# Patient Record
Sex: FEMALE | Race: WHITE | NOT HISPANIC OR LATINO | Employment: FULL TIME | URBAN - METROPOLITAN AREA
[De-identification: names, ages, dates, MRNs, and addresses within clinical notes are randomized per-mention and may not be internally consistent; named-entity substitution may affect disease eponyms.]

---

## 2017-08-31 ENCOUNTER — NON-PROVIDER VISIT (OUTPATIENT)
Dept: OCCUPATIONAL MEDICINE | Facility: CLINIC | Age: 22
End: 2017-08-31

## 2017-08-31 DIAGNOSIS — Z02.1 PRE-EMPLOYMENT DRUG SCREENING: ICD-10-CM

## 2017-08-31 DIAGNOSIS — Z02.1 DRUG TESTING, PRE-EMPLOYMENT: ICD-10-CM

## 2017-08-31 LAB
AMP AMPHETAMINE: NORMAL
COC COCAINE: NORMAL
INT CON NEG: NORMAL
INT CON POS: NORMAL
MET METHAMPHETAMINES: NORMAL
OPI OPIATES: NORMAL
PCP PHENCYCLIDINE: NORMAL
POC DRUG COMMENT 753798-OCCUPATIONAL HEALTH: NEGATIVE
THC: NORMAL

## 2017-08-31 PROCEDURE — 86580 TB INTRADERMAL TEST: CPT

## 2017-08-31 PROCEDURE — 80305 DRUG TEST PRSMV DIR OPT OBS: CPT | Performed by: PREVENTIVE MEDICINE

## 2018-07-31 ENCOUNTER — HOSPITAL ENCOUNTER (INPATIENT)
Dept: HOSPITAL 8 - LDOP | Age: 23
LOS: 2 days | Discharge: HOME | End: 2018-08-02
Attending: OBSTETRICS & GYNECOLOGY | Admitting: OBSTETRICS & GYNECOLOGY
Payer: COMMERCIAL

## 2018-07-31 VITALS — SYSTOLIC BLOOD PRESSURE: 127 MMHG | DIASTOLIC BLOOD PRESSURE: 75 MMHG

## 2018-07-31 VITALS — DIASTOLIC BLOOD PRESSURE: 78 MMHG | SYSTOLIC BLOOD PRESSURE: 122 MMHG

## 2018-07-31 VITALS — HEIGHT: 62 IN | WEIGHT: 145.51 LBS | BODY MASS INDEX: 26.78 KG/M2

## 2018-07-31 VITALS — SYSTOLIC BLOOD PRESSURE: 122 MMHG | DIASTOLIC BLOOD PRESSURE: 78 MMHG

## 2018-07-31 VITALS — DIASTOLIC BLOOD PRESSURE: 83 MMHG | SYSTOLIC BLOOD PRESSURE: 136 MMHG

## 2018-07-31 VITALS — DIASTOLIC BLOOD PRESSURE: 72 MMHG | SYSTOLIC BLOOD PRESSURE: 122 MMHG

## 2018-07-31 DIAGNOSIS — Z23: ICD-10-CM

## 2018-07-31 DIAGNOSIS — Z3A.35: ICD-10-CM

## 2018-07-31 LAB
<PLATELET ESTIMATE>: ADEQUATE
<PLT MORPHOLOGY>: (no result)
BASOPHILS # BLD AUTO: 0.01 X10^3/UL (ref 0–0.1)
BASOPHILS # BLD AUTO: 0.04 X10^3/UL (ref 0–0.1)
BASOPHILS NFR BLD AUTO: 0 % (ref 0–1)
BASOPHILS NFR BLD AUTO: 0 % (ref 0–1)
EOSINOPHIL # BLD AUTO: 0 X10^3/UL (ref 0–0.4)
EOSINOPHIL # BLD AUTO: 0.22 X10^3/UL (ref 0–0.4)
EOSINOPHIL NFR BLD AUTO: 0 % (ref 1–7)
EOSINOPHIL NFR BLD AUTO: 1 % (ref 1–7)
ERYTHROCYTE [DISTWIDTH] IN BLOOD BY AUTOMATED COUNT: 13.9 % (ref 9.6–15.2)
ERYTHROCYTE [DISTWIDTH] IN BLOOD BY AUTOMATED COUNT: 14.1 % (ref 9.6–15.2)
HYPOCHROMIA BLD QL SMEAR: (no result)
LYMPHOCYTES # BLD AUTO: 1.56 X10^3/UL (ref 1–3.4)
LYMPHOCYTES # BLD AUTO: 3.28 X10^3/UL (ref 1–3.4)
LYMPHOCYTES NFR BLD AUTO: 19 % (ref 22–44)
LYMPHOCYTES NFR BLD AUTO: 6 % (ref 22–44)
MCH RBC QN AUTO: 26.7 PG (ref 27–34.8)
MCH RBC QN AUTO: 26.7 PG (ref 27–34.8)
MCHC RBC AUTO-ENTMCNC: 33.5 G/DL (ref 32.4–35.8)
MCHC RBC AUTO-ENTMCNC: 33.6 G/DL (ref 32.4–35.8)
MCV RBC AUTO: 79.4 FL (ref 80–100)
MCV RBC AUTO: 79.8 FL (ref 80–100)
MD: (no result)
MD: NO
MICROCYTES BLD QL SMEAR: (no result)
MICROSCOPIC: (no result)
MONOCYTES # BLD AUTO: 0.8 X10^3/UL (ref 0.2–0.8)
MONOCYTES # BLD AUTO: 1.28 X10^3/UL (ref 0.2–0.8)
MONOCYTES NFR BLD AUTO: 3 % (ref 2–9)
MONOCYTES NFR BLD AUTO: 8 % (ref 2–9)
NEUTROPHILS # BLD AUTO: 12.25 X10^3/UL (ref 1.8–6.8)
NEUTROPHILS # BLD AUTO: 24.47 X10^3/UL (ref 1.8–6.8)
NEUTROPHILS NFR BLD AUTO: 72 % (ref 42–75)
NEUTROPHILS NFR BLD AUTO: 91 % (ref 42–75)
PLATELET # BLD AUTO: 275 X10^3/UL (ref 130–400)
PLATELET # BLD AUTO: 297 X10^3/UL (ref 130–400)
PMV BLD AUTO: 9.2 FL (ref 7.4–10.4)
PMV BLD AUTO: 9.5 FL (ref 7.4–10.4)
POLYCHROMASIA BLD QL SMEAR: (no result)
RBC # BLD AUTO: 4.09 X10^6/UL (ref 3.82–5.3)
RBC # BLD AUTO: 4.4 X10^6/UL (ref 3.82–5.3)

## 2018-07-31 PROCEDURE — 87086 URINE CULTURE/COLONY COUNT: CPT

## 2018-07-31 PROCEDURE — 90715 TDAP VACCINE 7 YRS/> IM: CPT

## 2018-07-31 PROCEDURE — 99285 EMERGENCY DEPT VISIT HI MDM: CPT

## 2018-07-31 PROCEDURE — 86850 RBC ANTIBODY SCREEN: CPT

## 2018-07-31 PROCEDURE — 85025 COMPLETE CBC W/AUTO DIFF WBC: CPT

## 2018-07-31 PROCEDURE — 10060 I&D ABSCESS SIMPLE/SINGLE: CPT

## 2018-07-31 PROCEDURE — 0UQMXZZ REPAIR VULVA, EXTERNAL APPROACH: ICD-10-PCS | Performed by: OBSTETRICS & GYNECOLOGY

## 2018-07-31 PROCEDURE — 36415 COLL VENOUS BLD VENIPUNCTURE: CPT

## 2018-07-31 PROCEDURE — 82803 BLOOD GASES ANY COMBINATION: CPT

## 2018-07-31 PROCEDURE — 81001 URINALYSIS AUTO W/SCOPE: CPT

## 2018-07-31 PROCEDURE — 86900 BLOOD TYPING SEROLOGIC ABO: CPT

## 2018-07-31 PROCEDURE — 87081 CULTURE SCREEN ONLY: CPT

## 2018-07-31 RX ADMIN — IBUPROFEN PRN MG: 600 TABLET ORAL at 22:18

## 2018-07-31 RX ADMIN — Medication SCH MLS/HR: at 16:13

## 2018-07-31 RX ADMIN — FENTANYL CITRATE PRN MCG: 50 INJECTION INTRAMUSCULAR; INTRAVENOUS at 03:52

## 2018-07-31 RX ADMIN — FENTANYL CITRATE PRN MCG: 50 INJECTION INTRAMUSCULAR; INTRAVENOUS at 04:47

## 2018-07-31 RX ADMIN — SODIUM CHLORIDE, SODIUM LACTATE, POTASSIUM CHLORIDE, AND CALCIUM CHLORIDE SCH MLS/HR: .6; .31; .03; .02 INJECTION, SOLUTION INTRAVENOUS at 11:00

## 2018-07-31 RX ADMIN — IBUPROFEN PRN MG: 600 TABLET ORAL at 06:37

## 2018-07-31 RX ADMIN — Medication SCH MLS/HR: at 06:13

## 2018-07-31 RX ADMIN — DOCUSATE SODIUM PRN MG: 100 CAPSULE, LIQUID FILLED ORAL at 22:19

## 2018-07-31 RX ADMIN — PRENATAL VIT W/ FE FUMARATE-FA TAB 27-0.8 MG SCH EACH: 27-0.8 TAB at 09:00

## 2018-07-31 RX ADMIN — SODIUM CHLORIDE, SODIUM LACTATE, POTASSIUM CHLORIDE, AND CALCIUM CHLORIDE SCH MLS/HR: .6; .31; .03; .02 INJECTION, SOLUTION INTRAVENOUS at 02:40

## 2018-07-31 RX ADMIN — SODIUM CHLORIDE, SODIUM LACTATE, POTASSIUM CHLORIDE, AND CALCIUM CHLORIDE SCH MLS/HR: .6; .31; .03; .02 INJECTION, SOLUTION INTRAVENOUS at 19:00

## 2018-08-01 VITALS — SYSTOLIC BLOOD PRESSURE: 118 MMHG | DIASTOLIC BLOOD PRESSURE: 68 MMHG

## 2018-08-01 VITALS — DIASTOLIC BLOOD PRESSURE: 68 MMHG | SYSTOLIC BLOOD PRESSURE: 118 MMHG

## 2018-08-01 VITALS — SYSTOLIC BLOOD PRESSURE: 117 MMHG | DIASTOLIC BLOOD PRESSURE: 70 MMHG

## 2018-08-01 VITALS — DIASTOLIC BLOOD PRESSURE: 79 MMHG | SYSTOLIC BLOOD PRESSURE: 127 MMHG

## 2018-08-01 RX ADMIN — Medication SCH MLS/HR: at 22:13

## 2018-08-01 RX ADMIN — SODIUM CHLORIDE, SODIUM LACTATE, POTASSIUM CHLORIDE, AND CALCIUM CHLORIDE SCH MLS/HR: .6; .31; .03; .02 INJECTION, SOLUTION INTRAVENOUS at 11:00

## 2018-08-01 RX ADMIN — Medication SCH MLS/HR: at 02:13

## 2018-08-01 RX ADMIN — SODIUM CHLORIDE, SODIUM LACTATE, POTASSIUM CHLORIDE, AND CALCIUM CHLORIDE SCH MLS/HR: .6; .31; .03; .02 INJECTION, SOLUTION INTRAVENOUS at 12:25

## 2018-08-01 RX ADMIN — IBUPROFEN PRN MG: 600 TABLET ORAL at 05:40

## 2018-08-01 RX ADMIN — Medication SCH MLS/HR: at 12:13

## 2018-08-01 RX ADMIN — DOCUSATE SODIUM PRN MG: 100 CAPSULE, LIQUID FILLED ORAL at 09:53

## 2018-08-01 RX ADMIN — PRENATAL VIT W/ FE FUMARATE-FA TAB 27-0.8 MG SCH EACH: 27-0.8 TAB at 09:53

## 2018-08-01 RX ADMIN — DOCUSATE SODIUM PRN MG: 100 CAPSULE, LIQUID FILLED ORAL at 19:36

## 2018-08-01 RX ADMIN — SODIUM CHLORIDE, SODIUM LACTATE, POTASSIUM CHLORIDE, AND CALCIUM CHLORIDE SCH MLS/HR: .6; .31; .03; .02 INJECTION, SOLUTION INTRAVENOUS at 03:00

## 2018-08-02 VITALS — DIASTOLIC BLOOD PRESSURE: 73 MMHG | SYSTOLIC BLOOD PRESSURE: 111 MMHG

## 2018-08-02 RX ADMIN — DOCUSATE SODIUM PRN MG: 100 CAPSULE, LIQUID FILLED ORAL at 08:42

## 2018-08-02 RX ADMIN — IBUPROFEN PRN MG: 600 TABLET ORAL at 03:16

## 2018-08-02 RX ADMIN — SODIUM CHLORIDE, SODIUM LACTATE, POTASSIUM CHLORIDE, AND CALCIUM CHLORIDE SCH MLS/HR: .6; .31; .03; .02 INJECTION, SOLUTION INTRAVENOUS at 11:00

## 2018-08-02 RX ADMIN — SODIUM CHLORIDE, SODIUM LACTATE, POTASSIUM CHLORIDE, AND CALCIUM CHLORIDE SCH MLS/HR: .6; .31; .03; .02 INJECTION, SOLUTION INTRAVENOUS at 03:00

## 2018-08-02 RX ADMIN — PRENATAL VIT W/ FE FUMARATE-FA TAB 27-0.8 MG SCH EACH: 27-0.8 TAB at 08:42

## 2018-08-02 RX ADMIN — Medication SCH MLS/HR: at 08:13

## 2019-04-19 ENCOUNTER — OFFICE VISIT (OUTPATIENT)
Dept: MEDICAL GROUP | Facility: MEDICAL CENTER | Age: 24
End: 2019-04-19
Payer: COMMERCIAL

## 2019-04-19 VITALS
TEMPERATURE: 97.9 F | HEART RATE: 110 BPM | BODY MASS INDEX: 21.9 KG/M2 | HEIGHT: 62 IN | WEIGHT: 119 LBS | SYSTOLIC BLOOD PRESSURE: 104 MMHG | DIASTOLIC BLOOD PRESSURE: 60 MMHG | RESPIRATION RATE: 16 BRPM | OXYGEN SATURATION: 95 %

## 2019-04-19 DIAGNOSIS — Z13.29 THYROID DISORDER SCREEN: ICD-10-CM

## 2019-04-19 DIAGNOSIS — Z13.220 LIPID SCREENING: ICD-10-CM

## 2019-04-19 DIAGNOSIS — L30.9 ECZEMA, UNSPECIFIED TYPE: ICD-10-CM

## 2019-04-19 DIAGNOSIS — Z00.00 ANNUAL PHYSICAL EXAM: ICD-10-CM

## 2019-04-19 PROCEDURE — 99395 PREV VISIT EST AGE 18-39: CPT | Performed by: NURSE PRACTITIONER

## 2019-04-19 RX ORDER — TRIAMCINOLONE ACETONIDE 1 MG/G
2-4 CREAM TOPICAL 2 TIMES DAILY
Qty: 1 TUBE | Refills: 5 | Status: SHIPPED | OUTPATIENT
Start: 2019-04-19 | End: 2019-12-03

## 2019-04-19 ASSESSMENT — PATIENT HEALTH QUESTIONNAIRE - PHQ9: CLINICAL INTERPRETATION OF PHQ2 SCORE: 0

## 2019-04-19 NOTE — PROGRESS NOTES
"Chief Complaint   Patient presents with   • Establish Care        • Eczema     IN EARS      Melina Alejandre is a 23 y.o. female here to establish care and for well exam.  She is , has a 9-month-old daughter.  She works at [employer].  She said difficulty with eczema, isolated to the ear canals.  Has used triamcinolone in the past with good results and requesting refill.    No problem-specific Assessment & Plan notes found for this encounter.    Current medicines (including changes today)  Current Outpatient Prescriptions   Medication Sig Dispense Refill   • triamcinolone acetonide (KENALOG) 0.1 % Cream Apply 2-4 g to affected area(s) 2 times a day. 1 Tube 5   • naproxen (NAPROSYN) 500 MG Tab Take 1 Tab by mouth 2 times a day, with meals. 60 Tab 0     No current facility-administered medications for this visit.      She  has no past medical history of Asthma; Diabetes (HCC); or Hypertension.  She  has no past surgical history on file.  Social History   Substance Use Topics   • Smoking status: Never Smoker   • Smokeless tobacco: Never Used   • Alcohol use Yes      Comment: once a month     Social History     Social History Narrative   • No narrative on file     Family History   Problem Relation Age of Onset   • Diabetes Father    • Heart Disease Paternal Grandmother    • Thyroid Mother    • Cancer Neg Hx    • Stroke Neg Hx      Family Status   Relation Status   • Fa Alive   • PGMo (Not Specified)   • Mo Alive   • Stevie Alive   • Neg Hx (Not Specified)         ROS  Problems listed discussed above, all other systems reviewed and negative     Objective:     /60 (BP Location: Left arm, Patient Position: Sitting, BP Cuff Size: Adult)   Pulse (!) 110   Temp 36.6 °C (97.9 °F) (Temporal)   Resp 16   Ht 1.575 m (5' 2\")   Wt 54 kg (119 lb)   SpO2 95%  Body mass index is 21.77 kg/m².  Physical Exam:  General: Alert, oriented in no acute distress.  Eye contact is good, speech is normal, affect " calm  HEENT: External ear canals with erythematous, dry, scaly skin.  Oral mucosa pink moist, no lesions. Nares patent. TMs gray with good landmarks bilaterally. No cervical or supraclavicular lymphadenopathy, thyroid isthmus palpable without masses or nodules.  Lungs: clear to auscultation bilaterally, good aeration, normal effort. No wheeze/ rhonchi/ rales.  CV: regular rate and rhythm, S1, S2. No murmur, no JVD, no edema. Pedal pulses 2 + bilaterally  Abdomen: soft, nontender, BS x4, No CVAT, no hepatosplenomegaly.  Ext: color normal, vascularity normal, temperature normal. No rash or lesions.  MS: No joint swelling or redness. Strength is 5/5 globally  Neuro: DTR 2+ bilaterally  Assessment and Plan:   The following treatment plan was discussed  1. Annual physical exam  Normal physical exam. General health and wellness discussion including healthy diet, regular exercise. 2000 iu Vit d3 advised daily. Preventative health screenings- labs as listed below. Advised regular dental cleanings, eye exam yearly.  Lipid Profile    Comp Metabolic Panel    TSH WITH REFLEX TO FT4   2. Eczema, unspecified type   skin care reviewed.  Advised daily use of Eucerin Aquaphor, Zyrtec.  May use Kenalog for flares  triamcinolone acetonide (KENALOG) 0.1 % Cream   3. Thyroid disorder screen  TSH WITH REFLEX TO FT4   4. Lipid screening  Lipid Profile       Followup: Annually and pending labs           Please note that this dictation was created using voice recognition software. I have worked with consultants from the vendor as well as technical experts from [facility] to optimize the interface. I have made every reasonable attempt to correct obvious errors, but I expect that there are errors of grammar and possibly content that I did not discover before finalizing the note.

## 2019-05-20 ENCOUNTER — PATIENT MESSAGE (OUTPATIENT)
Dept: MEDICAL GROUP | Facility: MEDICAL CENTER | Age: 24
End: 2019-05-20

## 2019-05-21 ENCOUNTER — PATIENT MESSAGE (OUTPATIENT)
Dept: MEDICAL GROUP | Facility: MEDICAL CENTER | Age: 24
End: 2019-05-21

## 2019-05-21 NOTE — TELEPHONE ENCOUNTER
From: Melina Alejandre  To: DOC Garzon  Sent: 5/20/2019 6:53 PM PDT  Subject: Non-Urgent Medical Question    Hi Dr. San. I recently got four of my molars taken out and they prescribed me amoxicillin and ibuprofen. I have been totally fine since Thursday until today I cannot hold any food down and I am vomiting and have diarrhea. My daughter this past two days has also been projectile vomiting. I am curious if this is because I didn't eat with my pill or if maybe my daughter had a stomach bug and gave it to me? Is there any way you could tell me ?

## 2019-05-21 NOTE — TELEPHONE ENCOUNTER
From: Melina Alejandre  To: DOC Garzon  Sent: 5/21/2019 10:27 AM PDT  Subject: Non-Urgent Medical Question    hello. So I woke up this morning feeling a lot better but then I had oatmeal, it has been around 30 minutes since I finished and I already feel like throwing up again. My stomach feels like it is su and not letting it breathe.     ----- Message -----  From: DOC Garzon  Sent: 5/21/19, 7:10 AM  To: Melina Alejandre  Subject: RE: Non-Urgent Medical Question    It sounds like you have picked up a stomach virus. Be sure you are drinking lots of fluids and stick to bland foods for the next day or 2, I would expect the vomiting to resolve at that point. If you have fever or abdominal pain we should have you come in for evaluation.    ----- Message -----   From: Melina Alejandre   Sent: 5/20/2019 6:53 PM PDT   To: DOC Garzon  Subject: Non-Urgent Medical Question    Ki San. I recently got four of my molars taken out and they prescribed me amoxicillin and ibuprofen. I have been totally fine since Thursday until today I cannot hold any food down and I am vomiting and have diarrhea. My daughter this past two days has also been projectile vomiting. I am curious if this is because I didn't eat with my pill or if maybe my daughter had a stomach bug and gave it to me? Is there any way you could tell me ?

## 2019-05-22 ENCOUNTER — APPOINTMENT (OUTPATIENT)
Dept: MEDICAL GROUP | Facility: MEDICAL CENTER | Age: 24
End: 2019-05-22
Payer: COMMERCIAL

## 2019-07-10 ENCOUNTER — PATIENT MESSAGE (OUTPATIENT)
Dept: MEDICAL GROUP | Facility: MEDICAL CENTER | Age: 24
End: 2019-07-10

## 2019-07-10 NOTE — TELEPHONE ENCOUNTER
From: Melina Alejandre  To: DOC Garzon  Sent: 7/10/2019 10:54 AM PDT  Subject: Non-Urgent Medical Question    Do you have any way you can fit me in today before 1:30? I hurt my foot at work.

## 2019-08-14 ENCOUNTER — HOSPITAL ENCOUNTER (OUTPATIENT)
Facility: MEDICAL CENTER | Age: 24
End: 2019-08-14
Attending: PREVENTIVE MEDICINE
Payer: COMMERCIAL

## 2019-08-14 ENCOUNTER — EMPLOYEE HEALTH (OUTPATIENT)
Dept: OCCUPATIONAL MEDICINE | Facility: CLINIC | Age: 24
End: 2019-08-14

## 2019-08-14 ENCOUNTER — EH NON-PROVIDER (OUTPATIENT)
Dept: OCCUPATIONAL MEDICINE | Facility: CLINIC | Age: 24
End: 2019-08-14

## 2019-08-14 VITALS
RESPIRATION RATE: 12 BRPM | WEIGHT: 117 LBS | BODY MASS INDEX: 21.53 KG/M2 | TEMPERATURE: 97.8 F | SYSTOLIC BLOOD PRESSURE: 110 MMHG | DIASTOLIC BLOOD PRESSURE: 68 MMHG | OXYGEN SATURATION: 99 % | HEART RATE: 70 BPM | HEIGHT: 62 IN

## 2019-08-14 DIAGNOSIS — Z02.89 ENCOUNTER FOR OCCUPATIONAL HEALTH ASSESSMENT: ICD-10-CM

## 2019-08-14 DIAGNOSIS — Z02.1 PRE-EMPLOYMENT DRUG SCREENING: ICD-10-CM

## 2019-08-14 LAB
AMP AMPHETAMINE: NORMAL
BAR BARBITURATES: NORMAL
BZO BENZODIAZEPINES: NORMAL
COC COCAINE: NORMAL
INT CON NEG: NORMAL
INT CON POS: NORMAL
MDMA ECSTASY: NORMAL
MET METHAMPHETAMINES: NORMAL
MTD METHADONE: NORMAL
OPI OPIATES: NORMAL
OXY OXYCODONE: NORMAL
PCP PHENCYCLIDINE: NORMAL
POC URINE DRUG SCREEN OCDRS: NORMAL
THC: NORMAL

## 2019-08-14 PROCEDURE — 80305 DRUG TEST PRSMV DIR OPT OBS: CPT | Performed by: PREVENTIVE MEDICINE

## 2019-08-14 PROCEDURE — 90636 HEP A/HEP B VACC ADULT IM: CPT | Performed by: PREVENTIVE MEDICINE

## 2019-08-14 PROCEDURE — 86787 VARICELLA-ZOSTER ANTIBODY: CPT | Performed by: PREVENTIVE MEDICINE

## 2019-08-14 PROCEDURE — 86735 MUMPS ANTIBODY: CPT | Performed by: PREVENTIVE MEDICINE

## 2019-08-14 PROCEDURE — 8915 PR COMPREHENSIVE PHYSICAL: Performed by: PREVENTIVE MEDICINE

## 2019-08-14 PROCEDURE — 86762 RUBELLA ANTIBODY: CPT | Performed by: PREVENTIVE MEDICINE

## 2019-08-14 PROCEDURE — 86480 TB TEST CELL IMMUN MEASURE: CPT | Performed by: PREVENTIVE MEDICINE

## 2019-08-14 PROCEDURE — 86765 RUBEOLA ANTIBODY: CPT | Performed by: PREVENTIVE MEDICINE

## 2019-08-15 LAB
MEV IGG SER IA-ACNC: 0.13
MUV IGG SER IA-ACNC: 1.13
RUBV AB SER QL: 33.7 IU/ML
VZV IGG SER IA-ACNC: 1.49

## 2019-08-16 ENCOUNTER — OFFICE VISIT (OUTPATIENT)
Dept: MEDICAL GROUP | Facility: MEDICAL CENTER | Age: 24
End: 2019-08-16
Payer: COMMERCIAL

## 2019-08-16 ENCOUNTER — HOSPITAL ENCOUNTER (OUTPATIENT)
Facility: MEDICAL CENTER | Age: 24
End: 2019-08-16
Attending: NURSE PRACTITIONER
Payer: COMMERCIAL

## 2019-08-16 VITALS
BODY MASS INDEX: 21.53 KG/M2 | SYSTOLIC BLOOD PRESSURE: 110 MMHG | DIASTOLIC BLOOD PRESSURE: 80 MMHG | HEART RATE: 118 BPM | WEIGHT: 117 LBS | OXYGEN SATURATION: 96 % | HEIGHT: 62 IN | RESPIRATION RATE: 16 BRPM | TEMPERATURE: 98.1 F

## 2019-08-16 DIAGNOSIS — R30.0 DYSURIA: ICD-10-CM

## 2019-08-16 DIAGNOSIS — Z11.8 SCREENING FOR CHLAMYDIAL DISEASE: ICD-10-CM

## 2019-08-16 DIAGNOSIS — Z23 NEED FOR VACCINATION: ICD-10-CM

## 2019-08-16 LAB
APPEARANCE UR: CLEAR
BILIRUB UR STRIP-MCNC: NORMAL MG/DL
COLOR UR AUTO: YELLOW
GAMMA INTERFERON BACKGROUND BLD IA-ACNC: 0.05 IU/ML
GLUCOSE UR STRIP.AUTO-MCNC: NEGATIVE MG/DL
KETONES UR STRIP.AUTO-MCNC: NORMAL MG/DL
LEUKOCYTE ESTERASE UR QL STRIP.AUTO: NORMAL
M TB IFN-G BLD-IMP: NEGATIVE
M TB IFN-G CD4+ BCKGRND COR BLD-ACNC: 0.02 IU/ML
MITOGEN IGNF BCKGRD COR BLD-ACNC: >10 IU/ML
NITRITE UR QL STRIP.AUTO: NEGATIVE
PH UR STRIP.AUTO: 6 [PH] (ref 5–8)
PROT UR QL STRIP: NORMAL MG/DL
QFT TB2 - NIL TBQ2: 0 IU/ML
RBC UR QL AUTO: NORMAL
SP GR UR STRIP.AUTO: 1.02
UROBILINOGEN UR STRIP-MCNC: 0.2 MG/DL

## 2019-08-16 PROCEDURE — 87086 URINE CULTURE/COLONY COUNT: CPT

## 2019-08-16 PROCEDURE — 90621 MENB-FHBP VACC 2/3 DOSE IM: CPT | Performed by: NURSE PRACTITIONER

## 2019-08-16 PROCEDURE — 99213 OFFICE O/P EST LOW 20 MIN: CPT | Mod: 25 | Performed by: NURSE PRACTITIONER

## 2019-08-16 PROCEDURE — 90707 MMR VACCINE SC: CPT | Performed by: NURSE PRACTITIONER

## 2019-08-16 PROCEDURE — 87186 SC STD MICRODIL/AGAR DIL: CPT

## 2019-08-16 PROCEDURE — 81002 URINALYSIS NONAUTO W/O SCOPE: CPT | Performed by: NURSE PRACTITIONER

## 2019-08-16 PROCEDURE — 87077 CULTURE AEROBIC IDENTIFY: CPT

## 2019-08-16 PROCEDURE — 90472 IMMUNIZATION ADMIN EACH ADD: CPT | Performed by: NURSE PRACTITIONER

## 2019-08-16 PROCEDURE — 90471 IMMUNIZATION ADMIN: CPT | Performed by: NURSE PRACTITIONER

## 2019-08-16 PROCEDURE — 90651 9VHPV VACCINE 2/3 DOSE IM: CPT | Performed by: NURSE PRACTITIONER

## 2019-08-16 RX ORDER — SULFAMETHOXAZOLE AND TRIMETHOPRIM 800; 160 MG/1; MG/1
1 TABLET ORAL 2 TIMES DAILY
Qty: 10 TAB | Refills: 0 | Status: SHIPPED | OUTPATIENT
Start: 2019-08-16 | End: 2019-08-21

## 2019-08-16 NOTE — PROGRESS NOTES
"Subjective:     Chief Complaint   Patient presents with   • Vaginitis   • UTI     Melina lAejandre is a 23 y.o. female established patient here for evaluation of dysuria.  She initially thought that she had a yeast infection, started using an over-the-counter treatment for this but is not improving.  She is having burning with urination, possibly blood in urine although she had been menstruating as well.  Denies associated nausea, fever, chills, back pain.  , monogamous  She has recently had titers drawn for MMR and varicella.  She will be starting work at the [employer] and needs a booster dose on MMR.  She is also due for HPV and Trumenba  No problem-specific Assessment & Plan notes found for this encounter.       Current medicines (including changes today)  Current Outpatient Medications   Medication Sig Dispense Refill   • sulfamethoxazole-trimethoprim (BACTRIM DS) 800-160 MG tablet Take 1 Tab by mouth 2 times a day for 5 days. 10 Tab 0   • triamcinolone acetonide (KENALOG) 0.1 % Cream Apply 2-4 g to affected area(s) 2 times a day. 1 Tube 5   • naproxen (NAPROSYN) 500 MG Tab Take 1 Tab by mouth 2 times a day, with meals. (Patient not taking: Reported on 8/16/2019) 60 Tab 0     No current facility-administered medications for this visit.      She  has no past medical history of Asthma, Diabetes (HCC), or Hypertension.    ROS included above     Objective:     /80 (BP Location: Left arm, Patient Position: Sitting, BP Cuff Size: Adult)   Pulse (!) 118   Temp 36.7 °C (98.1 °F) (Temporal)   Resp 16   Ht 1.575 m (5' 2\")   Wt 53.1 kg (117 lb)   SpO2 96%  Body mass index is 21.4 kg/m².     Physical Exam:  General: Alert, oriented in no acute distress.  Eye contact is good, speech is normal, affect calm  Lungs: clear to auscultation bilaterally, normal effort, no wheeze/ rhonchi/ rales.  CV: regular rate and rhythm, S1, S2, no murmur  Abdomen: soft, nontender, No CVAT  Ext: no edema, color " normal, vascularity normal, temperature normal    Assessment and Plan:   The following treatment plan was discussed   1. Dysuria   urinalysis positive for leukocytes, nitrates, blood.  She is menstruating.  We will send culture, start Bactrim.  Encouraged to increase fluids  sulfamethoxazole-trimethoprim (BACTRIM DS) 800-160 MG tablet    URINE CULTURE(NEW)    POCT Urinalysis   2. Need for vaccination  I have placed the below orders and discussed them with an approved delegating provider. The MA is performing the below orders under the direction of Dr. Vitale  9VHPV Vaccine 2-3 Dose (GARDASIL 9)    Meningococcal Vaccine Serogroup B 2-3 Dose (TRUMENBA)    MMR SQ            Followup: pending culture         Please note that this dictation was created using voice recognition software. I have worked with consultants from the vendor as well as technical experts from [facility] to optimize the interface. I have made every reasonable attempt to correct obvious errors, but I expect that there are errors of grammar and possibly content that I did not discover before finalizing the note.

## 2019-08-18 LAB
BACTERIA UR CULT: ABNORMAL
BACTERIA UR CULT: ABNORMAL
SIGNIFICANT IND 70042: ABNORMAL
SITE SITE: ABNORMAL
SOURCE SOURCE: ABNORMAL

## 2019-08-19 ENCOUNTER — PATIENT MESSAGE (OUTPATIENT)
Dept: MEDICAL GROUP | Facility: MEDICAL CENTER | Age: 24
End: 2019-08-19

## 2019-08-19 ENCOUNTER — EH NON-PROVIDER (OUTPATIENT)
Dept: OCCUPATIONAL MEDICINE | Facility: CLINIC | Age: 24
End: 2019-08-19

## 2019-08-19 DIAGNOSIS — Z71.85 IMMUNIZATION COUNSELING: ICD-10-CM

## 2019-08-19 NOTE — PROGRESS NOTES
Pre-employment medical surveillance reviewed and signed. MMR vaccine series required. MMR #1 administered on 8/16/19. MMR #2 scheduled for 19/16/19.  Quantiferon result documented in immunizations. Document returned to assigned MA.

## 2019-08-19 NOTE — TELEPHONE ENCOUNTER
From: Melina Alejandre  To: DOC Garzon  Sent: 8/19/2019 12:52 PM PDT  Subject: Non-Urgent Medical Question    I have a question about Urine Culture resulted on 8/18/19, 4:34 PM.  I am feeling a little better but it is still very itchy. Also what does my results mean?

## 2019-08-23 ENCOUNTER — PATIENT MESSAGE (OUTPATIENT)
Dept: MEDICAL GROUP | Facility: MEDICAL CENTER | Age: 24
End: 2019-08-23

## 2019-08-24 NOTE — TELEPHONE ENCOUNTER
From: Melina Alejandre  To: DOC Garzon  Sent: 8/23/2019 6:34 PM PDT  Subject: Non-Urgent Medical Question    Is one of the vaccines i received on the 14th the chicken pox one?

## 2019-08-26 ENCOUNTER — HOSPITAL ENCOUNTER (OUTPATIENT)
Dept: LAB | Facility: MEDICAL CENTER | Age: 24
End: 2019-08-26
Payer: COMMERCIAL

## 2019-08-26 LAB
BDY FAT % MEASURED: 18.2 %
BP DIAS: 66 MMHG
BP SYS: 100 MMHG
CHOLEST SERPL-MCNC: 128 MG/DL (ref 100–199)
DIABETES HTDIA: NO
EVENT NAME HTEVT: NORMAL
FASTING HTFAS: YES
GLUCOSE SERPL-MCNC: 104 MG/DL (ref 65–99)
HDLC SERPL-MCNC: 44 MG/DL
HYPERTENSION HTHYP: NO
LDLC SERPL CALC-MCNC: 67 MG/DL
SCREENING LOC CITY HTCIT: NORMAL
SCREENING LOC STATE HTSTA: NORMAL
SCREENING LOCATION HTLOC: NORMAL
SMOKING HTSMO: NO
SUBSCRIBER ID HTSID: NORMAL
TRIGL SERPL-MCNC: 85 MG/DL (ref 0–149)

## 2019-08-26 PROCEDURE — 80061 LIPID PANEL: CPT

## 2019-08-26 PROCEDURE — 82947 ASSAY GLUCOSE BLOOD QUANT: CPT

## 2019-08-26 PROCEDURE — 36415 COLL VENOUS BLD VENIPUNCTURE: CPT

## 2019-08-26 PROCEDURE — S5190 WELLNESS ASSESSMENT BY NONPH: HCPCS

## 2019-09-16 ENCOUNTER — EH NON-PROVIDER (OUTPATIENT)
Dept: OCCUPATIONAL MEDICINE | Facility: CLINIC | Age: 24
End: 2019-09-16

## 2019-09-16 DIAGNOSIS — Z23 NEED FOR VACCINATION: ICD-10-CM

## 2019-09-16 PROCEDURE — 90707 MMR VACCINE SC: CPT | Performed by: PREVENTIVE MEDICINE

## 2019-09-16 PROCEDURE — 90636 HEP A/HEP B VACC ADULT IM: CPT | Performed by: PREVENTIVE MEDICINE

## 2019-09-17 ENCOUNTER — PATIENT MESSAGE (OUTPATIENT)
Dept: MEDICAL GROUP | Facility: MEDICAL CENTER | Age: 24
End: 2019-09-17

## 2019-09-17 NOTE — TELEPHONE ENCOUNTER
From: Melina Alejandre  To: DOC Garzon  Sent: 9/17/2019 1:27 PM PDT  Subject: Non-Urgent Medical Question    Hi doc so the past three days I have had really bad diarrhea to where every time I eat I am having to run to the restroom.

## 2019-11-13 ENCOUNTER — NON-PROVIDER VISIT (OUTPATIENT)
Dept: OCCUPATIONAL MEDICINE | Facility: CLINIC | Age: 24
End: 2019-11-13

## 2019-11-13 DIAGNOSIS — Z23 NEED FOR VACCINATION: ICD-10-CM

## 2019-11-13 PROCEDURE — 90686 IIV4 VACC NO PRSV 0.5 ML IM: CPT | Performed by: PREVENTIVE MEDICINE

## 2019-11-18 ENCOUNTER — PATIENT MESSAGE (OUTPATIENT)
Dept: MEDICAL GROUP | Facility: MEDICAL CENTER | Age: 24
End: 2019-11-18

## 2019-11-26 ENCOUNTER — APPOINTMENT (OUTPATIENT)
Dept: MEDICAL GROUP | Facility: MEDICAL CENTER | Age: 24
End: 2019-11-26

## 2019-12-03 ENCOUNTER — HOSPITAL ENCOUNTER (OUTPATIENT)
Facility: MEDICAL CENTER | Age: 24
End: 2019-12-03
Attending: NURSE PRACTITIONER
Payer: COMMERCIAL

## 2019-12-03 ENCOUNTER — OFFICE VISIT (OUTPATIENT)
Dept: MEDICAL GROUP | Facility: MEDICAL CENTER | Age: 24
End: 2019-12-03
Payer: COMMERCIAL

## 2019-12-03 VITALS
DIASTOLIC BLOOD PRESSURE: 90 MMHG | SYSTOLIC BLOOD PRESSURE: 132 MMHG | WEIGHT: 130 LBS | HEART RATE: 105 BPM | RESPIRATION RATE: 16 BRPM | TEMPERATURE: 99.3 F | OXYGEN SATURATION: 95 % | BODY MASS INDEX: 23.92 KG/M2 | HEIGHT: 62 IN

## 2019-12-03 DIAGNOSIS — N92.6 ABNORMAL MENSES: ICD-10-CM

## 2019-12-03 DIAGNOSIS — N89.8 VAGINAL ITCHING: ICD-10-CM

## 2019-12-03 PROCEDURE — 87510 GARDNER VAG DNA DIR PROBE: CPT

## 2019-12-03 PROCEDURE — 87480 CANDIDA DNA DIR PROBE: CPT

## 2019-12-03 PROCEDURE — 87660 TRICHOMONAS VAGIN DIR PROBE: CPT

## 2019-12-03 PROCEDURE — 99214 OFFICE O/P EST MOD 30 MIN: CPT | Performed by: NURSE PRACTITIONER

## 2019-12-04 DIAGNOSIS — N89.8 VAGINAL ITCHING: ICD-10-CM

## 2019-12-04 LAB
CANDIDA DNA VAG QL PROBE+SIG AMP: NEGATIVE
G VAGINALIS DNA VAG QL PROBE+SIG AMP: NEGATIVE
T VAGINALIS DNA VAG QL PROBE+SIG AMP: NEGATIVE

## 2019-12-04 NOTE — ASSESSMENT & PLAN NOTE
"Pt reports early that her menstrual cycle is usually regular, 28 to 30 days.  She had a menstrual period 10/13/2019 and then her following.  Was about a week early starting 11/9/2019.  It was lighter than usual, she describes it as \"spotting\". She is not using any contraception, she is fine with getting pregnant.  She has not taken an at home pregnancy test  "

## 2019-12-04 NOTE — PROGRESS NOTES
"Subjective:     Chief Complaint   Patient presents with   • Vaginal Itching     Melina Alejandre is a 24 y.o. female here today to follow up on:    Abnormal menses  Pt reports early that her menstrual cycle is usually regular, 28 to 30 days.  She had a menstrual period 10/13/2019 and then her following.  Was about a week early starting 11/9/2019.  It was lighter than usual, she describes it as \"spotting\". She is not using any contraception, she is fine with getting pregnant.  She has not taken an at home pregnancy test.  In a monogamous relationship x7 years    Vaginal itching  Recurrent difficulty with external vaginal itching ongoing over the last several months, one specific area at 6:00 to the vaginal introitus.  States that she was seen by OB/GYN who saw no abnormalities.  She has had STD screening and Pap which were reportedly normal.  She has no significant vaginal discharge, no odor.  No associated pain.  She has tried applying Neosporin without any benefit       Current medicines (including changes today)  No current outpatient medications on file.     No current facility-administered medications for this visit.      She  has no past medical history of Asthma, Diabetes (HCC), or Hypertension.    ROS included above     Objective:     /90 (BP Location: Left arm, Patient Position: Sitting, BP Cuff Size: Adult)   Pulse (!) 105   Temp 37.4 °C (99.3 °F) (Temporal)   Resp 16   Ht 1.575 m (5' 2\")   Wt 59 kg (130 lb)   SpO2 95%  Body mass index is 23.78 kg/m².     Physical Exam:  General: Alert, oriented in no acute distress.  Eye contact is good, speech is normal, affect calm  Lungs: clear to auscultation bilaterally, normal effort, no wheeze/ rhonchi/ rales.  CV: regular rate and rhythm, S1, S2, no murmur  Abdomen: soft, nontender  Pelvic: external genitalia pink, moist. No lesions, no erythema, no rash or tissue changes.  Itching reported at 6:00 to the vaginal introitus, no abnormality visualized. " Vaginal canal with scant white discharge.   Ext: no edema, color normal, vascularity normal, temperature normal    Assessment and Plan:   The following treatment plan was discussed  1. Abnormal menses   last menstrual period about a week earlier than expected, lighter than usual.  Pregnancy test in the office today negative.  She is okay with getting pregnant.  Encouraged to take prenatal vitamin  POCT Pregnancy   2. Vaginal itching   no abnormality on exam.  She has had STD screening through her OB/GYN which was negative.  No visible rash or skin changes.  May try small amount of cortisone when experiencing pruritus  VAGINAL PATHOGENS DNA PANEL       Followup: As needed         Please note that this dictation was created using voice recognition software. I have worked with consultants from the vendor as well as technical experts from [facility] [email or web address] to optimize the interface. I have made every reasonable attempt to correct obvious errors, but I expect that there are errors of grammar and possibly content that I did not discover before finalizing the note.

## 2019-12-04 NOTE — ASSESSMENT & PLAN NOTE
Recurrent difficulty with external vaginal itching, one specific area at 6:00 to the vaginal introitus.  States that she was seen by OB/GYN who saw no abnormalities.  She has had STD screening and Pap which were reportedly normal.  She has no significant vaginal discharge, no odor.  No associated pain.  She has tried applying Neosporin without any benefit

## 2019-12-19 ENCOUNTER — PATIENT MESSAGE (OUTPATIENT)
Dept: MEDICAL GROUP | Facility: MEDICAL CENTER | Age: 24
End: 2019-12-19

## 2019-12-19 NOTE — TELEPHONE ENCOUNTER
From: Melina Alejandre  To: DOC Garzon  Sent: 12/19/2019 12:20 PM PST  Subject: Non-Urgent Medical Question    Hey doc! So anne isn't letting Dasha my  log in but he has been throwing up since 2 AM. He has thrown up 6 times now every hour. He's also has a bad stomach ache, headache, chills and hot flashes. Should we try and make an appointment or is there something else we can do?

## 2020-01-08 ENCOUNTER — GYNECOLOGY VISIT (OUTPATIENT)
Dept: OBGYN | Facility: CLINIC | Age: 25
End: 2020-01-08
Payer: COMMERCIAL

## 2020-01-08 ENCOUNTER — HOSPITAL ENCOUNTER (OUTPATIENT)
Facility: MEDICAL CENTER | Age: 25
End: 2020-01-08
Attending: OBSTETRICS & GYNECOLOGY
Payer: COMMERCIAL

## 2020-01-08 VITALS — SYSTOLIC BLOOD PRESSURE: 124 MMHG | BODY MASS INDEX: 24.69 KG/M2 | DIASTOLIC BLOOD PRESSURE: 82 MMHG | WEIGHT: 135 LBS

## 2020-01-08 DIAGNOSIS — L29.2 VULVAR ITCHING: ICD-10-CM

## 2020-01-08 DIAGNOSIS — N89.8 VAGINAL ITCHING: ICD-10-CM

## 2020-01-08 PROCEDURE — 87660 TRICHOMONAS VAGIN DIR PROBE: CPT

## 2020-01-08 PROCEDURE — 87480 CANDIDA DNA DIR PROBE: CPT

## 2020-01-08 PROCEDURE — 87510 GARDNER VAG DNA DIR PROBE: CPT

## 2020-01-08 PROCEDURE — 99203 OFFICE O/P NEW LOW 30 MIN: CPT | Performed by: OBSTETRICS & GYNECOLOGY

## 2020-01-08 NOTE — NON-PROVIDER
Pt is here for a new pt visit   She has been experencing vaginal itching for 6 months.  [phone]

## 2020-01-08 NOTE — PROGRESS NOTES
"GYN New patient visit  CC: vulvovaginal itching     Melina lAejandre is a 24 y.o.  who presents for evaluation of vulvovaginal itching.  Reprots that for the past 6 months she has had itching.  She has seen a GYN and PCP for this.  The GYN told her there was a little tear but she's unsure of what that meant.  Her PCP told her to use an anti itch cream with helps some.  She also was found to have a yeast infection in September and took diflucan which helped her symptoms for 2-3 days.  This itching is mostly on the outside/vulva area and not on the inside within the vaginal canal.  She feels like she has a lot of vaginal discharge in general but hasn't had any foul smelling or heavier discharge associated with this itching.  She shaves and is unsure if this is associated.  She was using \"vaginal soaps\" a lot this summer and she has stopped using them in the last couple of months.  This itching seems to occur randomly and that it will for 1 or 2 days and then not occur for a week or so.  She has not noticed any temporal relation with her menses.  Has not seen any lesions or abnormalities on her skin.  Denies any rashes in other places on her body except for she does have some eczema mostly with in her ears.  Denies any lesions in her mouth or issues with dry mouth/dry eyes.    GYN History:  Patient's last menstrual period was 2019.. Menarche @14.  Menses regular, lasting 5-6 days, not particularly heavy or painful.  Last pap 2019,  no h/o abnormal pap.  No history of cone biopsy, LEEP or any other cervical, uterine or gynecologic surgery. No history of sexually transmitted diseases.  Currently sexually active with one male partner.  Utilizing nothing for contraception and has used nothing in the past.     OB History:    OB History    Para Term  AB Living   1 1   1   1   SAB TAB Ectopic Molar Multiple Live Births             1      # Outcome Date GA Lbr [place]/2nd Weight Sex Delivery Anes PTL Lv   1 "  2018 35w0d   F Vag-Spont   AIXA       Health Maintenance:  Immunizations: up to date    Review of Systems:   Pertinent positives documented in HPI and all other systems reviewed & are negative.    All PMH, PSH, allergies, social history and FH reviewed and updated today:  Past Medical History:  History reviewed. No pertinent past medical history.    Past Surgical History:  History reviewed. No pertinent surgical history.    Medications:   No current Epic-ordered outpatient medications on file.     No current Epic-ordered facility-administered medications on file.        Allergies: Patient has no known allergies.    Social History:  Social History     Socioeconomic History   • Marital status:      Spouse name: Not on file   • Number of children: Not on file   • Years of education: Not on file   • Highest education level: Not on file   Occupational History   • Not on file   Social Needs   • Financial resource strain: Not on file   • Food insecurity:     Worry: Not on file     Inability: Not on file   • Transportation needs:     Medical: Not on file     Non-medical: Not on file   Tobacco Use   • Smoking status: Never Smoker   • Smokeless tobacco: Never Used   Substance and Sexual Activity   • Alcohol use: Yes     Comment: once a month   • Drug use: No   • Sexual activity: Yes     Partners: Male     Birth control/protection: Condom   Lifestyle   • Physical activity:     Days per week: Not on file     Minutes per session: Not on file   • Stress: Not on file   Relationships   • Social connections:     Talks on phone: Not on file     Gets together: Not on file     Attends Congregational service: Not on file     Active member of club or organization: Not on file     Attends meetings of clubs or organizations: Not on file     Relationship status: Not on file   • Intimate partner violence:     Fear of current or ex partner: Not on file     Emotionally abused: Not on file     Physically abused: Not on file     Forced  sexual activity: Not on file   Other Topics Concern   • Not on file   Social History Narrative   • Not on file       Family History:  Family History   Problem Relation Age of Onset   • Diabetes Father    • Heart Disease Paternal Grandmother    • Thyroid Mother    • Cancer Neg Hx    • Stroke Neg Hx            Objective:   Vitals:  Wt 61.2 kg (135 lb)   Body mass index is 24.69 kg/m². (Goal BM I>18 <25)    Physical Exam:   Nursing note and vitals reviewed.  Physical Exam   GENERAL: No acute distress  HENT: Atraumatic, normocephalic  EYES: Extraocular movements intact, pupils equal and reactive to light  NECK: Supple, Full ROM  CHEST: No deformities, Equal chest expansion  RESP: Unlabored, no stridor or audible wheeze  ABD: Soft, Nontender, Non-Distended  Extremities: No Clubbing, Cyanosis, or Edema  Skin: Warm/dry, without rases  Neuro: A/O x 4, CN 2-12 Grossly intact, Motor/sensory grossly intact  Psych: Normal mood, behavior, and affect  Genitourinary:  Normal appearing external female genitalia without any rashes, lesions, labial fusion or tenderness.  Terminal hair s/p shaving.  Vagina is pink moist and well rugated.  Physiologic discharge present within vaginal vault.  Cervix well visualized without masses or lesions.  On bimanual exam there is no cervical motion tenderness, uterus is anteverted, not enlarged, fixed, or tender.  No adnexal masses or tenderness.      Assessment/Plan:     1. Vaginal itching  VAGINAL PATHOGENS DNA PANEL       Melina Alejandre is a 24 y.o.  female who presents for vulvar itching.  #Vulvar itching.  Vaginal DNA probe obtained today however no obvious infection and therefore will only treat pending these results.  Vulvar hygiene discussed in detail with patient today.  Sounds like she has made some changes to her vulvar hygiene and she has had slight improvement of her symptoms since switching to mild soap.  Also encouraged her to stop shaving and not wear underwear at night.   Encouraged her to avoid scratching.  #Contraception.  Patient declines as she does not mind if she becomes pregnant.  Encourage prenatal vitamin.  #Follow-up in 3 months    Patient was seen for 30 minutes of which > 50% of appointment time was spent on face-to-face counseling and coordination of care regarding the above.

## 2020-02-08 ENCOUNTER — PATIENT MESSAGE (OUTPATIENT)
Dept: MEDICAL GROUP | Facility: MEDICAL CENTER | Age: 25
End: 2020-02-08

## 2020-02-09 ENCOUNTER — PATIENT MESSAGE (OUTPATIENT)
Dept: MEDICAL GROUP | Facility: MEDICAL CENTER | Age: 25
End: 2020-02-09

## 2020-02-09 RX ORDER — TRIAMCINOLONE ACETONIDE 1 MG/G
2-4 CREAM TOPICAL 2 TIMES DAILY
Qty: 454 G | Refills: 0 | Status: ON HOLD | OUTPATIENT
Start: 2020-02-09 | End: 2022-01-23

## 2020-02-10 NOTE — TELEPHONE ENCOUNTER
From: Melina Alejandre  To: DOC Garzon  Sent: 2/9/2020 8:51 PM PST  Subject: Prescription Question    Perfect for the [facility] [address]?   ----- Message -----  From: DOC Garzon  Sent: 2/9/20, 8:47 PM  To: Melina Alejandre  Subject: RE: Prescription Question    Ki Summers,  I think we can switch to a bottle. I will send in a new prescription.  Michelle GRAY      ----- Message -----   From: Melina Alejandre   Sent: 2/8/2020 6:34 PM PST   To: DOC Garzon  Subject: Prescription Question    Can I get a refill on my triamcinolone prescription? And is there a way I can request it to be in a bottle instead of a tube. This last time it cut itself opened and I lost a lot

## 2020-02-10 NOTE — TELEPHONE ENCOUNTER
From: Melina Alejandre  To: DOC Garzon  Sent: 2/8/2020 6:34 PM PST  Subject: Prescription Question    Can I get a refill on my triamcinolone prescription? And is there a way I can request it to be in a bottle instead of a tube. This last time it cut itself opened and I lost a lot

## 2020-02-21 ENCOUNTER — EH NON-PROVIDER (OUTPATIENT)
Dept: OCCUPATIONAL MEDICINE | Facility: CLINIC | Age: 25
End: 2020-02-21

## 2020-02-21 DIAGNOSIS — Z23 NEED FOR VACCINATION: Primary | ICD-10-CM

## 2020-02-21 PROCEDURE — 90636 HEP A/HEP B VACC ADULT IM: CPT | Performed by: PREVENTIVE MEDICINE

## 2020-02-24 ENCOUNTER — PATIENT MESSAGE (OUTPATIENT)
Dept: MEDICAL GROUP | Facility: MEDICAL CENTER | Age: 25
End: 2020-02-24

## 2020-02-25 ENCOUNTER — PATIENT MESSAGE (OUTPATIENT)
Dept: MEDICAL GROUP | Facility: MEDICAL CENTER | Age: 25
End: 2020-02-25

## 2020-02-25 NOTE — TELEPHONE ENCOUNTER
From: Melina Alejandre  To: DOC Garzon  Sent: 2/25/2020 7:10 AM PST  Subject: Non-Urgent Medical Question    Okay what medicine would be best for congestion and cough. Also my ears have been clogged since I woke up like it's really hard to hear       ----- Message -----   From:DOC Garzon   Sent:2/25/2020 7:09 AM PST   To:Melina Alejandre   Subject:RE: Non-Urgent Medical Question    No, would not be related to the vaccine. You may just be getting a virus. Be sure that you are getting plenty of rest, you can use over-the-counter medications if you need to. If it is not getting better within a few days please make an appointment for evaluation.      ----- Message -----   From:Melina Alejandre   Sent:2/24/2020 3:47 PM PST   To:DOC Garzon   Subject:Non-Urgent Medical Question    I got my last hepA/hepB shot on Friday and now I'm feeling under the weather with a sore throat and congestion could this be from that or no?

## 2020-02-25 NOTE — TELEPHONE ENCOUNTER
From: Melina Alejandre  To: DOC Garzon  Sent: 2/24/2020 3:47 PM PST  Subject: Non-Urgent Medical Question    I got my last hepA/hepB shot on Friday and now I'm feeling under the weather with a sore throat and congestion could this be from that or no?

## 2020-03-06 ENCOUNTER — PATIENT MESSAGE (OUTPATIENT)
Dept: MEDICAL GROUP | Facility: MEDICAL CENTER | Age: 25
End: 2020-03-06

## 2020-03-09 NOTE — TELEPHONE ENCOUNTER
From: Melina Alejandre  To: DOC Garzon  Sent: 3/6/2020 11:54 AM PST  Subject: Non-Urgent Medical Question    So my vagina is still really itchy. And I've been finding tiny little cuts from scratching and shaving it. Can I use anything to help speed up the healing process? Like baby powder or any other type of ointment?

## 2020-04-29 ENCOUNTER — PATIENT MESSAGE (OUTPATIENT)
Dept: MEDICAL GROUP | Facility: MEDICAL CENTER | Age: 25
End: 2020-04-29

## 2020-04-29 NOTE — TELEPHONE ENCOUNTER
From: Melina Alejandre  To: DOC Garzon  Sent: 4/29/2020 1:06 PM PDT  Subject: Non-Urgent Medical Question    Not so much an infection it just feels like raw skin or tiny cuts.       ----- Message -----   From:DOC Garzon   Sent:4/29/2020 12:46 PM PDT   To:Melina Alejandre   Subject:RE: Non-Urgent Medical Question    Ki Summers,  Tucks pads are you generally used for itching in the rectal area, or for hemorrhoids, not sure that this is going to do anything to help with the vaginal itching. Do you feel like you may have an infection?  Michelle GRAY      ----- Message -----   From:Melina Alejandre   Sent:4/29/2020 1:11 AM PDT   To:DOC Garzon   Subject:Non-Urgent Medical Question    Hello, so my vagina is having an itch again and I was going to ask if I can use tucks pads to help soothe the pain or would that make it worse?

## 2020-05-19 ENCOUNTER — PATIENT MESSAGE (OUTPATIENT)
Dept: MEDICAL GROUP | Facility: MEDICAL CENTER | Age: 25
End: 2020-05-19

## 2020-05-20 ENCOUNTER — PATIENT MESSAGE (OUTPATIENT)
Dept: MEDICAL GROUP | Facility: MEDICAL CENTER | Age: 25
End: 2020-05-20

## 2020-05-20 NOTE — TELEPHONE ENCOUNTER
From: Melina Alejandre  To: DOC Garzon  Sent: 5/20/2020 9:37 AM PDT  Subject: Non-Urgent Medical Question    I don't feel like I am having any infections. I haven't seen any extra discharge at all but it just feels tight and itchy once every two weeks. I am using dove unscented soap.       ----- Message -----   From:DOC Garzon   Sent:5/20/2020 8:39 AM PDT   To:Melina Alejandre   Subject:RE: Non-Urgent Medical Question    Ki Summers,  I am not sure what else may be helpful for you provided that you do not feel that you have an infection. Have you had any increase in discharge?  You are using a mild soap and no fragranced products in the vaginal area, right?  Michelle GRAY      ----- Message -----   From:Melina Alejandre   Sent:5/19/2020 2:28 AM PDT   To:DOC Garzon   Subject:Non-Urgent Medical Question    Hey doc, I am still having problems with my vagina being itchy. It did go away for at least a week where I didn't feel like I needed to itch and yesterday it was like the first wipe after peeing it was so itchy. I am supposed to start my period in two days. I'm not sure if that matters at all. I have been using like Neosporin and aquaphor on it. Is there any other type of medicine I can try to take or use on it? I'm just very uncomfortable.

## 2020-05-27 ENCOUNTER — NON-PROVIDER VISIT (OUTPATIENT)
Dept: URGENT CARE | Facility: PHYSICIAN GROUP | Age: 25
End: 2020-05-27

## 2020-05-27 DIAGNOSIS — Z02.1 PRE-EMPLOYMENT DRUG SCREENING: ICD-10-CM

## 2020-05-27 PROCEDURE — 80305 DRUG TEST PRSMV DIR OPT OBS: CPT | Performed by: FAMILY MEDICINE

## 2020-08-06 ENCOUNTER — PATIENT MESSAGE (OUTPATIENT)
Dept: MEDICAL GROUP | Facility: MEDICAL CENTER | Age: 25
End: 2020-08-06

## 2020-10-13 ENCOUNTER — PATIENT MESSAGE (OUTPATIENT)
Dept: MEDICAL GROUP | Facility: MEDICAL CENTER | Age: 25
End: 2020-10-13

## 2020-10-14 ENCOUNTER — PATIENT MESSAGE (OUTPATIENT)
Dept: MEDICAL GROUP | Facility: MEDICAL CENTER | Age: 25
End: 2020-10-14

## 2020-10-14 DIAGNOSIS — Z11.59 SCREENING FOR VIRAL DISEASE: ICD-10-CM

## 2020-10-14 NOTE — TELEPHONE ENCOUNTER
From: Melina Alejandre  To: DOC Garzon  Sent: 10/14/2020 12:09 PM PDT  Subject: Non-Urgent Medical Question    Do I just go today or when?       ----- Message -----   From:DOC Garzon   Sent:10/14/2020 12:07 PM PDT   To:Melina Alejandre   Subject:RE: Non-Urgent Medical Question    I will order the test for you, it can be done at the drive-through test site south of my office off of [address] You do not need an appointment, they are open from 7 AM to 2:30 PM.      ----- Message -----   From:Melina Alejandre   Sent:10/14/2020 10:27 AM PDT   To:DOC Garzon   Subject:Non-Urgent Medical Question    I have had allergies in the past. My brother is kind of just freaking out about it. So I was gonna see if I could get tested just to be sure.       ----- Message -----   From:DOC Garzon   Sent:10/13/2020 3:24 PM PDT   To:Melina Alejandre   Subject:RE: Non-Urgent Medical Question    You can be tested for screening if you would like. There is also a lot of pollen in the air right now, so possible that it could be allergies. Have you had allergies in the past?  Let me know if you would like a test.      ----- Message -----   From:Melina Alejandre   Sent:10/13/2020 11:03 AM PDT   To:DOC Garzon   Subject:Non-Urgent Medical Question    [facility] get [facility] COVID test done. The only symptom I have is a stuffy nose. I still have my taste and smell but I have been stuffed up since Friday.

## 2020-10-14 NOTE — TELEPHONE ENCOUNTER
From: Melina Alejandre  To: DOC Garzon  Sent: 10/14/2020 10:27 AM PDT  Subject: Non-Urgent Medical Question    I have had allergies in the past. My brother is kind of just freaking out about it. So I was gonna see if I could get tested just to be sure.       ----- Message -----   From:DOC Garzon   Sent:10/13/2020 3:24 PM PDT   To:Melina Alejandre   Subject:RE: Non-Urgent Medical Question    You can be tested for screening if you would like. There is also a lot of pollen in the air right now, so possible that it could be allergies. Have you had allergies in the past?  Let me know if you would like a test.      ----- Message -----   From:Melina Alejandre   Sent:10/13/2020 11:03 AM PDT   To:DOC Garzon   Subject:Non-Urgent Medical Question    Hey doc I was wondering if I should go get a COVID test done. The only symptom I have is a stuffy nose. I still have my taste and smell but I have been stuffed up since Friday.

## 2021-01-02 ENCOUNTER — PATIENT MESSAGE (OUTPATIENT)
Dept: MEDICAL GROUP | Facility: MEDICAL CENTER | Age: 26
End: 2021-01-02

## 2021-01-02 NOTE — TELEPHONE ENCOUNTER
From: Melina Alejandre  To: DOC Garzon  Sent: 1/2/2021 1:17 AM PST  Subject: Non-Urgent Medical Question    Hey doc! So bob and I were wondering in your professional opinion do you think the vaccine is worth getting? If so are we able to schedule an appointment to receive it?

## 2021-01-03 ENCOUNTER — PATIENT MESSAGE (OUTPATIENT)
Dept: MEDICAL GROUP | Facility: MEDICAL CENTER | Age: 26
End: 2021-01-03

## 2021-01-03 NOTE — TELEPHONE ENCOUNTER
From: Melina Alejandre  To: DOC Garzon  Sent: 1/2/2021 4:07 PM PST  Subject: Non-Urgent Medical Question    We are both working at the [email or web address] right now. Do you have any news on when it would be available for just essential workers?       ----- Message -----   From:DOC Garzon   Sent:1/2/2021 3:28 PM PST   To:Melina Alejandre   Subject:RE: Non-Urgent Medical Question    Ki Chavishel,  I definitely think it is worth getting. I got the first dose just before Licha.  We do not give this in my office, it is done through the health department. Right now it is only available for healthcare workers. I do not remember where you are working?  Michelle GRAY      ----- Message -----   From:Melina Alejandre   Sent:1/2/2021 1:17 AM PST   To:DOC Garzon   Subject:Non-Urgent Medical Question    Hey doc! So bob and I were wondering in your professional opinion do you think the vaccine is worth getting? If so are we able to schedule an appointment to receive it?

## 2021-03-23 ENCOUNTER — OFFICE VISIT (OUTPATIENT)
Dept: MEDICAL GROUP | Facility: MEDICAL CENTER | Age: 26
End: 2021-03-23
Payer: COMMERCIAL

## 2021-03-23 VITALS
TEMPERATURE: 98.9 F | BODY MASS INDEX: 28.03 KG/M2 | HEART RATE: 98 BPM | OXYGEN SATURATION: 100 % | HEIGHT: 62 IN | DIASTOLIC BLOOD PRESSURE: 80 MMHG | WEIGHT: 152.34 LBS | SYSTOLIC BLOOD PRESSURE: 122 MMHG | RESPIRATION RATE: 20 BRPM

## 2021-03-23 DIAGNOSIS — Z32.01 POSITIVE PREGNANCY TEST: ICD-10-CM

## 2021-03-23 LAB
INT CON NEG: NEGATIVE
INT CON POS: POSITIVE
POC URINE PREGNANCY TEST: NEGATIVE

## 2021-03-23 PROCEDURE — 99213 OFFICE O/P EST LOW 20 MIN: CPT | Performed by: NURSE PRACTITIONER

## 2021-03-23 PROCEDURE — 81025 URINE PREGNANCY TEST: CPT | Performed by: NURSE PRACTITIONER

## 2021-03-23 ASSESSMENT — PATIENT HEALTH QUESTIONNAIRE - PHQ9: CLINICAL INTERPRETATION OF PHQ2 SCORE: 0

## 2021-03-24 NOTE — PROGRESS NOTES
"Subjective:     Chief Complaint   Patient presents with   • Advice Only     miscarriage, spotting at the moment.     Melina Alejandre is a 25 y.o. female established patient here for evaluation of possible miscarriage.  Reports last menstrual period is February 4.  Reports that she had a positive pregnancy test at home March 12.  Then about 4 days ago, March 20, started her menses.  She initially had some left lower quadrant discomfort but this seems to be subsiding.  Flow has been normal for her and lessening in the last day.  One prior pregnancy.  No fever, chills, acute abdominal pain, nausea, vomiting, breast tenderness.  No passing clots or tissue    No problem-specific Assessment & Plan notes found for this encounter.       Current medicines (including changes today)  Current Outpatient Medications   Medication Sig Dispense Refill   • triamcinolone acetonide (KENALOG) 0.1 % Cream Apply 2-4 g to affected area(s) 2 times a day. 454 g 0     No current facility-administered medications for this visit.     She  has no past medical history of Asthma, Diabetes (HCC), or Hypertension.    ROS included above     Objective:     /80 (BP Location: Left arm, Patient Position: Sitting, BP Cuff Size: Adult)   Pulse 98   Temp 37.2 °C (98.9 °F) (Temporal)   Resp 20   Ht 1.575 m (5' 2\")   Wt 69.1 kg (152 lb 5.4 oz)   SpO2 100%  Body mass index is 27.86 kg/m².     Physical Exam:  General: Alert, oriented in no acute distress.  Eye contact is good, speech is normal, affect calm  Lungs: clear to auscultation bilaterally, normal effort, no wheeze/ rhonchi/ rales.  CV: regular rate and rhythm, S1, S2, no murmur  Abdomen: soft, nontender  Ext: no edema, color normal, vascularity normal, temperature normal    Assessment and Plan:   The following treatment plan was discussed   1. Positive pregnancy test   reports having had a positive pregnancy test at home on the 12th, now on her menses.  Pregnancy test in the office is " negative.  Possible early miscarriage.  No acute pain, fever, chills, heavy bleeding.  Will monitor for now, she may contact me with any concerns moving forward.  POCT Pregnancy       Followup: as needed         Please note that this dictation was created using voice recognition software. I have worked with consultants from the vendor as well as technical experts from [facility] to optimize the interface. I have made every reasonable attempt to correct obvious errors, but I expect that there are errors of grammar and possibly content that I did not discover before finalizing the note.

## 2021-06-22 LAB
ABO GROUP BLD: NORMAL
HBV SURFACE AG SERPL QL IA: NEGATIVE
HIV 1+2 AB+HIV1 P24 AG SERPL QL IA: NEGATIVE
RH BLD: NORMAL
RUBV IGG SERPL IA-ACNC: NORMAL
TREPONEMA PALLIDUM IGG+IGM AB [PRESENCE] IN SERUM OR PLASMA BY IMMUNOASSAY: NORMAL

## 2021-08-27 ENCOUNTER — HOSPITAL ENCOUNTER (OUTPATIENT)
Facility: MEDICAL CENTER | Age: 26
End: 2021-08-27
Attending: OBSTETRICS & GYNECOLOGY | Admitting: OBSTETRICS & GYNECOLOGY
Payer: COMMERCIAL

## 2022-01-08 LAB — GP B STREP DNA SPEC QL NAA+PROBE: NEGATIVE

## 2022-01-22 ENCOUNTER — HOSPITAL ENCOUNTER (INPATIENT)
Facility: MEDICAL CENTER | Age: 27
LOS: 1 days | End: 2022-01-23
Attending: OBSTETRICS & GYNECOLOGY | Admitting: OBSTETRICS & GYNECOLOGY
Payer: COMMERCIAL

## 2022-01-22 DIAGNOSIS — G89.18 POSTOPERATIVE PAIN: ICD-10-CM

## 2022-01-22 LAB
BASOPHILS # BLD AUTO: 0.2 % (ref 0–1.8)
BASOPHILS # BLD: 0.03 K/UL (ref 0–0.12)
EOSINOPHIL # BLD AUTO: 0.2 K/UL (ref 0–0.51)
EOSINOPHIL NFR BLD: 1.6 % (ref 0–6.9)
ERYTHROCYTE [DISTWIDTH] IN BLOOD BY AUTOMATED COUNT: 40.2 FL (ref 35.9–50)
ERYTHROCYTE [DISTWIDTH] IN BLOOD BY AUTOMATED COUNT: 41 FL (ref 35.9–50)
HCT VFR BLD AUTO: 32.6 % (ref 37–47)
HCT VFR BLD AUTO: 37.6 % (ref 37–47)
HGB BLD-MCNC: 10.6 G/DL (ref 12–16)
HGB BLD-MCNC: 12.5 G/DL (ref 12–16)
HOLDING TUBE BB 8507: NORMAL
IMM GRANULOCYTES # BLD AUTO: 0.07 K/UL (ref 0–0.11)
IMM GRANULOCYTES NFR BLD AUTO: 0.6 % (ref 0–0.9)
LYMPHOCYTES # BLD AUTO: 2.33 K/UL (ref 1–4.8)
LYMPHOCYTES NFR BLD: 18.6 % (ref 22–41)
MCH RBC QN AUTO: 25.4 PG (ref 27–33)
MCH RBC QN AUTO: 26.2 PG (ref 27–33)
MCHC RBC AUTO-ENTMCNC: 32.5 G/DL (ref 33.6–35)
MCHC RBC AUTO-ENTMCNC: 33.2 G/DL (ref 33.6–35)
MCV RBC AUTO: 78.2 FL (ref 81.4–97.8)
MCV RBC AUTO: 78.7 FL (ref 81.4–97.8)
MONOCYTES # BLD AUTO: 1.24 K/UL (ref 0–0.85)
MONOCYTES NFR BLD AUTO: 9.9 % (ref 0–13.4)
NEUTROPHILS # BLD AUTO: 8.65 K/UL (ref 2–7.15)
NEUTROPHILS NFR BLD: 69.1 % (ref 44–72)
NRBC # BLD AUTO: 0 K/UL
NRBC BLD-RTO: 0 /100 WBC
PLATELET # BLD AUTO: 234 K/UL (ref 164–446)
PLATELET # BLD AUTO: 248 K/UL (ref 164–446)
PMV BLD AUTO: 11.4 FL (ref 9–12.9)
PMV BLD AUTO: 11.6 FL (ref 9–12.9)
RBC # BLD AUTO: 4.17 M/UL (ref 4.2–5.4)
RBC # BLD AUTO: 4.78 M/UL (ref 4.2–5.4)
SARS-COV+SARS-COV-2 AG RESP QL IA.RAPID: DETECTED
SPECIMEN SOURCE: ABNORMAL
WBC # BLD AUTO: 12.5 K/UL (ref 4.8–10.8)
WBC # BLD AUTO: 18.4 K/UL (ref 4.8–10.8)

## 2022-01-22 PROCEDURE — 700101 HCHG RX REV CODE 250

## 2022-01-22 PROCEDURE — 87426 SARSCOV CORONAVIRUS AG IA: CPT

## 2022-01-22 PROCEDURE — 700111 HCHG RX REV CODE 636 W/ 250 OVERRIDE (IP)

## 2022-01-22 PROCEDURE — 36415 COLL VENOUS BLD VENIPUNCTURE: CPT

## 2022-01-22 PROCEDURE — 85025 COMPLETE CBC W/AUTO DIFF WBC: CPT

## 2022-01-22 PROCEDURE — 302449 STATCHG TRIAGE ONLY (STATISTIC)

## 2022-01-22 PROCEDURE — 304965 HCHG RECOVERY SERVICES

## 2022-01-22 PROCEDURE — 0UQMXZZ REPAIR VULVA, EXTERNAL APPROACH: ICD-10-PCS | Performed by: OBSTETRICS & GYNECOLOGY

## 2022-01-22 PROCEDURE — 0HQ9XZZ REPAIR PERINEUM SKIN, EXTERNAL APPROACH: ICD-10-PCS | Performed by: OBSTETRICS & GYNECOLOGY

## 2022-01-22 PROCEDURE — 59409 OBSTETRICAL CARE: CPT

## 2022-01-22 PROCEDURE — 770002 HCHG ROOM/CARE - OB PRIVATE (112)

## 2022-01-22 PROCEDURE — 85027 COMPLETE CBC AUTOMATED: CPT

## 2022-01-22 PROCEDURE — 700111 HCHG RX REV CODE 636 W/ 250 OVERRIDE (IP): Performed by: OBSTETRICS & GYNECOLOGY

## 2022-01-22 RX ORDER — DOCUSATE SODIUM 100 MG/1
100 CAPSULE, LIQUID FILLED ORAL 2 TIMES DAILY PRN
Status: DISCONTINUED | OUTPATIENT
Start: 2022-01-22 | End: 2022-01-23 | Stop reason: HOSPADM

## 2022-01-22 RX ORDER — SODIUM CHLORIDE, SODIUM LACTATE, POTASSIUM CHLORIDE, CALCIUM CHLORIDE 600; 310; 30; 20 MG/100ML; MG/100ML; MG/100ML; MG/100ML
INJECTION, SOLUTION INTRAVENOUS PRN
Status: DISCONTINUED | OUTPATIENT
Start: 2022-01-22 | End: 2022-01-23 | Stop reason: HOSPADM

## 2022-01-22 RX ORDER — SODIUM CHLORIDE, SODIUM LACTATE, POTASSIUM CHLORIDE, CALCIUM CHLORIDE 600; 310; 30; 20 MG/100ML; MG/100ML; MG/100ML; MG/100ML
INJECTION, SOLUTION INTRAVENOUS CONTINUOUS
Status: DISCONTINUED | OUTPATIENT
Start: 2022-01-22 | End: 2022-01-23 | Stop reason: HOSPADM

## 2022-01-22 RX ORDER — OXYTOCIN 10 [USP'U]/ML
10 INJECTION, SOLUTION INTRAMUSCULAR; INTRAVENOUS
Status: DISCONTINUED | OUTPATIENT
Start: 2022-01-22 | End: 2022-01-22 | Stop reason: HOSPADM

## 2022-01-22 RX ORDER — LIDOCAINE HYDROCHLORIDE 10 MG/ML
INJECTION, SOLUTION INFILTRATION; PERINEURAL
Status: COMPLETED
Start: 2022-01-22 | End: 2022-01-22

## 2022-01-22 RX ORDER — OXYCODONE HYDROCHLORIDE 5 MG/1
5 TABLET ORAL EVERY 4 HOURS PRN
Status: DISCONTINUED | OUTPATIENT
Start: 2022-01-22 | End: 2022-01-23 | Stop reason: HOSPADM

## 2022-01-22 RX ORDER — IBUPROFEN 800 MG/1
800 TABLET ORAL 3 TIMES DAILY PRN
Status: DISCONTINUED | OUTPATIENT
Start: 2022-01-22 | End: 2022-01-23 | Stop reason: HOSPADM

## 2022-01-22 RX ORDER — TERBUTALINE SULFATE 1 MG/ML
0.25 INJECTION, SOLUTION SUBCUTANEOUS
Status: DISCONTINUED | OUTPATIENT
Start: 2022-01-22 | End: 2022-01-22 | Stop reason: HOSPADM

## 2022-01-22 RX ORDER — ACETAMINOPHEN 500 MG
1000 TABLET ORAL EVERY 6 HOURS PRN
Status: DISCONTINUED | OUTPATIENT
Start: 2022-01-22 | End: 2022-01-23 | Stop reason: HOSPADM

## 2022-01-22 RX ORDER — MISOPROSTOL 200 UG/1
600 TABLET ORAL
Status: DISCONTINUED | OUTPATIENT
Start: 2022-01-22 | End: 2022-01-23 | Stop reason: HOSPADM

## 2022-01-22 RX ADMIN — FENTANYL CITRATE 100 MCG: 50 INJECTION INTRAMUSCULAR; INTRAVENOUS at 11:30

## 2022-01-22 RX ADMIN — OXYTOCIN 125 ML/HR: 10 INJECTION, SOLUTION INTRAMUSCULAR; INTRAVENOUS at 13:22

## 2022-01-22 RX ADMIN — Medication 2000 ML/HR: at 12:12

## 2022-01-22 RX ADMIN — LIDOCAINE HYDROCHLORIDE: 10 INJECTION, SOLUTION INFILTRATION; PERINEURAL at 12:28

## 2022-01-22 ASSESSMENT — LIFESTYLE VARIABLES
ON A TYPICAL DAY WHEN YOU DRINK ALCOHOL HOW MANY DRINKS DO YOU HAVE: 0
EVER_SMOKED: NEVER
TOTAL SCORE: 0
CONSUMPTION TOTAL: NEGATIVE
ALCOHOL_USE: NO
HAVE PEOPLE ANNOYED YOU BY CRITICIZING YOUR DRINKING: NO
EVER FELT BAD OR GUILTY ABOUT YOUR DRINKING: NO
TOTAL SCORE: 0
TOTAL SCORE: 0
AVERAGE NUMBER OF DAYS PER WEEK YOU HAVE A DRINK CONTAINING ALCOHOL: 0
HOW MANY TIMES IN THE PAST YEAR HAVE YOU HAD 5 OR MORE DRINKS IN A DAY: 0
HAVE YOU EVER FELT YOU SHOULD CUT DOWN ON YOUR DRINKING: NO
EVER HAD A DRINK FIRST THING IN THE MORNING TO STEADY YOUR NERVES TO GET RID OF A HANGOVER: NO

## 2022-01-22 ASSESSMENT — PAIN DESCRIPTION - PAIN TYPE
TYPE: ACUTE PAIN

## 2022-01-22 ASSESSMENT — COPD QUESTIONNAIRES
DURING THE PAST 4 WEEKS HOW MUCH DID YOU FEEL SHORT OF BREATH: NONE/LITTLE OF THE TIME
IN THE PAST 12 MONTHS DO YOU DO LESS THAN YOU USED TO BECAUSE OF YOUR BREATHING PROBLEMS: DISAGREE/UNSURE
DO YOU EVER COUGH UP ANY MUCUS OR PHLEGM?: NO/ONLY WITH OCCASIONAL COLDS OR INFECTIONS
HAVE YOU SMOKED AT LEAST 100 CIGARETTES IN YOUR ENTIRE LIFE: NO/DON'T KNOW

## 2022-01-22 ASSESSMENT — PATIENT HEALTH QUESTIONNAIRE - PHQ9
2. FEELING DOWN, DEPRESSED, IRRITABLE, OR HOPELESS: NOT AT ALL
SUM OF ALL RESPONSES TO PHQ9 QUESTIONS 1 AND 2: 0
1. LITTLE INTEREST OR PLEASURE IN DOING THINGS: NOT AT ALL

## 2022-01-22 NOTE — PROGRESS NOTES
0930- Assumed care of pt, POC discussed, pt verbalized understanding.    1205- Dr. Jonas at bedside.     1210- Head out viable baby girl.     1350- Pt up to bathroom in stable condition. Successful void. Radha care provided. Pad and panties applied. Gown changed.     1428- Dr. Jonas called with update on BP, orders received to continue to monitor and call MD for any pressures systolic 160 or diastolic 110.    1435- Pt transferred via wheelchair in holding infant in stable condition accompanied by her  to MB .    1440- SBAR given to CYDNEY Estrada

## 2022-01-22 NOTE — PROGRESS NOTES
0855- pt to triage bed 3 c/o uc with bloody show since 0630 today. Pt denies lof and states +fm. Maternal tachycardia noted. Pt symptomatic. Pt denies cp,palpitations or sob. Pt is afebrile. audible fht 150. Will do sve and call dr rodriguez with results    0915- sve done and pt is 4-5 with bulging bag. Repeat bp done and report case to dr rodriguez and orders to admit for labor    0930- report given to herman escalante

## 2022-01-22 NOTE — H&P
DATE OF ADMISSION:  2022     CHIEF COMPLAINT:  Uterine contractions.     HISTORY OF PRESENT ILLNESS:  The patient is a 26-year-old female  3,   para 1 at 38-1/2 weeks' gestational age.  She has had prenatal care with Dr. Valadez.  Her pregnancy has been uncomplicated.     PAST MEDICAL HISTORY:  Negative.     PAST SURGICAL HISTORY:  Negative.     FAMILY HISTORY:  Includes diabetes, chronic hypertension, renal complications.     SOCIAL HISTORY:  Negative.     ALLERGIES:  The patient has no known drug allergies.     CURRENT MEDICATIONS:  Include prenatal vitamins.     LABORATORY DATA:  Labs show blood type of O positive, rubella immune.  Her   group B strep culture is negative.     PHYSICAL EXAMINATION:    VITAL SIGNS:  Stable on admission.  CARDIOVASCULAR:  Regular rate and rhythm.  LUNGS:  Clear.  ABDOMEN:  Gravid.  PELVIC:  Has a vaginal exam of 7 cm, 90% effaced, 0 station.  Fetal heart   tones are 150s and category 1 reactive.  Tocometry is every 4-6 minutes.     ASSESSMENT AND PLAN:  This is a term intrauterine pregnancy, in active labor.    I just performed rupture of membranes with clear fluid.  She is declining   epidural placement at this time.  A vaginal delivery is expected.        ______________________________  Corinne E. Capurro, MD [facility]    DD:  2022 11:25  DT:  2022 11:44    Job#:  [id number]

## 2022-01-22 NOTE — L&D DELIVERY NOTE
DATE OF SERVICE:  01/22/2022     This is a private patient of Dr. Renee.  She underwent normal spontaneous   vaginal delivery of a viable female infant over intact perineum without   epidural placement.  Spontaneous vaginal delivery of the head in controlled   sterile fashion.  No nuchal cord was noted.  The rest of the infant delivered   through uncomplicated.  Cord was clamped x 2 and cut and infant was handed   over to the waiting nursing staff.  Delivery of intact placenta with 3-vessel   cord.  A small left labial laceration was repaired with local lidocaine and   3-0 Vicryl.  A small perineal first-degree laceration was repaired using local   lidocaine and 4-0 Vicryl.  Total estimated blood loss was approximately 400   mL. Apgars of the infant were 8 and 9.        ______________________________  Corinne E. Capurro, MD [facility]    DD:  01/22/2022 12:47  DT:  01/22/2022 14:16    Job#:  [id number]

## 2022-01-23 VITALS
TEMPERATURE: 98 F | HEIGHT: 62 IN | SYSTOLIC BLOOD PRESSURE: 118 MMHG | RESPIRATION RATE: 17 BRPM | BODY MASS INDEX: 31.28 KG/M2 | HEART RATE: 100 BPM | OXYGEN SATURATION: 98 % | DIASTOLIC BLOOD PRESSURE: 88 MMHG | WEIGHT: 170 LBS

## 2022-01-23 PROCEDURE — A9270 NON-COVERED ITEM OR SERVICE: HCPCS | Performed by: OBSTETRICS & GYNECOLOGY

## 2022-01-23 PROCEDURE — 700102 HCHG RX REV CODE 250 W/ 637 OVERRIDE(OP): Performed by: OBSTETRICS & GYNECOLOGY

## 2022-01-23 RX ORDER — IBUPROFEN 800 MG/1
800 TABLET ORAL 3 TIMES DAILY PRN
Qty: 30 TABLET | Refills: 1 | Status: SHIPPED | OUTPATIENT
Start: 2022-01-23 | End: 2022-10-05

## 2022-01-23 RX ADMIN — ACETAMINOPHEN 1000 MG: 500 TABLET ORAL at 00:52

## 2022-01-23 ASSESSMENT — EDINBURGH POSTNATAL DEPRESSION SCALE (EPDS)
I HAVE BEEN SO UNHAPPY THAT I HAVE BEEN CRYING: NO, NEVER
I HAVE BEEN ANXIOUS OR WORRIED FOR NO GOOD REASON: NO, NOT AT ALL
THINGS HAVE BEEN GETTING ON TOP OF ME: NO, I HAVE BEEN COPING AS WELL AS EVER
I HAVE BEEN SO UNHAPPY THAT I HAVE HAD DIFFICULTY SLEEPING: NOT AT ALL
I HAVE FELT SAD OR MISERABLE: NO, NOT AT ALL
I HAVE LOOKED FORWARD WITH ENJOYMENT TO THINGS: AS MUCH AS I EVER DID
I HAVE BLAMED MYSELF UNNECESSARILY WHEN THINGS WENT WRONG: NO, NEVER
I HAVE BEEN ABLE TO LAUGH AND SEE THE FUNNY SIDE OF THINGS: AS MUCH AS I ALWAYS COULD
THE THOUGHT OF HARMING MYSELF HAS OCCURRED TO ME: NEVER
I HAVE FELT SCARED OR PANICKY FOR NO GOOD REASON: NO, NOT AT ALL

## 2022-01-23 ASSESSMENT — PAIN DESCRIPTION - PAIN TYPE: TYPE: ACUTE PAIN

## 2022-01-23 NOTE — DISCHARGE INSTRUCTIONS
PATIENT DISCHARGE EDUCATION INSTRUCTION SHEET  REASONS TO CALL YOUR OBSTETRICIAN  · Persistent fever, shaking, chills (Temperature higher than 100.4) may indicate you have an infection  · Heavy bleeding: soaking more than 1 pad per hour; Passing clots an egg-sized clot or bigger may mean you have an postpartum hemorrhage  · Foul odor from vagina or bad smelling or discolored discharge or blood  · Breast infection (Mastitis symptoms); breast pain, chills, fever, redness or red streaks, may feel flu like symptoms  · Urinary pain, burning or frequency  · Incision that is not healing, increased redness, swelling, tenderness or pain, or any pus from episiotomy or  site may mean you have an infection  · Redness, swelling, warmth, or painful to touch in the calf area of your leg may mean you have a blood clot  · Severe or intensified depression, thoughts or feelings of wanting to hurt yourself or someone else   · Pain in chest, obstructed breathing or shortness of breath (trouble catching your breath) may mean you are having a postpartum complication. Call your provider immediately   · Headache that does not get better, even after taking medicine, a bad headache with vision changes or pain in the upper right area of your belly may mean you have high blood pressure or post birth preeclampsia. Call your provider immediately    HAND WASHING  All family and friends should wash their hands:  · Before and after holding the baby  · Before feeding the baby  · After using the restroom or changing the baby's diaper    WOUND CARE  Ask your physician for additional care instructions. In general:  ·  Incision:  · May shower and pat incision dry   · Keep the incision clean and dry  · There should not be any opening or pus from the incision  · Continue to walk at home 3 times a day   · Do NOT lift anything heavier than your baby (over 10 pounds)  · Encourage family to help participate in care of the  to allow  rest and mom time to heal  · Episiotomy/Laceration  · May use noe-spray bottle, witch hazel pads and dermaplast spray for comfort  · Use noe-spray bottle after urinating to cleanse perineal area  · To prevent burning during urination spray noe-water bottle on labial area   · Pat perineal area dry until episiotomy/laceration is healed  · Continue to use noe-bottle until bleeding stops as needed  · If have a 2nd degree laceration or greater, a Sitz bath can offer relief from soreness, burning, and inflammation   · Sitz Bath   · Sit in 6 inches of warm water and soak laceration as needed until the laceration heals    VAGINAL CARE AND BLEEDING  · Nothing inside vagina for 6 weeks:   · No sexual intercourse, tampons or douching  · Bleeding may continue for 2-4 weeks. Amount and color may vary  · Soaking 1 pad or more in an hour for several hours is considered heavy bleeding  · Passing large egg sized blood clots can be concerning  · If you feel like you have heavy bleeding or are having increasing amount of blood clots call your Obstetrician immediately  · If you begin feeling faint upon standing, feeling sick to your stomach, have clammy skin, a really fast heartbeat, have chills, start feeling confused, dizzy, sleepy or weak, or feeling like you're going to faint call your Obstetrician immediately    HYPERTENSION   Preeclampsia or gestational hypertension are types of high blood pressure that only pregnant women can get. It is important for you to be aware of symptoms to seek early intervention and treatment. If you have any of these symptoms immediately call your Obstetrician    · Vision changes or blurred vision   · Severe headache or pain that is unrelieved with medication and will not go away  · Persistent pain in upper abdomen or shoulder   · Increased swelling of face, feet, or hands  · Difficulty breathing or shortness of breath at rest  · Urinating less than usual    URINATION AND BOWEL MOVEMENTS  · Eating  "more fiber (bran cereal, fruits, and vegetables) and drinking plenty of fluids will help to avoid constipation  · Urinary frequency and urgency after childbirth is normal  · If you experience any urinary pain, burning or frequency call your provider    BIRTH CONTROL  · It is possible to become pregnant at any time after delivery and while breastfeeding  · Plan to discuss a method of birth control with your physician at your post delivery follow up visit    POSTPARTUM BLUES  During the first few days after birth, you may experience a sense of the \"blues\" which may include impatience, irritability or even crying. These feelings come and go quickly. However, as many as 1 in 10 women experience emotional symptoms known as postpartum depression.     POSTPARTUM DEPRESSION    May start as early as the second or third day after delivery or take several weeks or months to develop. Symptoms of \"blues\" are present, but are more intense: Crying spells; loss of appetite; feelings of hopelessness or loss of control; fear of touching the baby; over concern or no concern at all about the baby; little or no concern about your own appearance/caring for yourself; and/or inability to sleep or excessive sleeping. Contact your Obstetrician if you are experiencing any of these symptoms     PREVENTING SHAKEN BABY  If you are angry or stressed, PUT THE BABY IN THE CRIB, step away, take some deep breaths, and wait until you are calm to care for the baby. DO NOT SHAKE THE BABY. You are not alone, call a supporter for help.  · [facility] 24/7 crisis call line (932-119-4692) or (1-470.462.7804)  · You can also text them, text \"ANSWER\" (504087)      "

## 2022-01-23 NOTE — PROGRESS NOTES
PPD#1  S) pt without c/o  O) vss  Fundus; firm  Ext; no edema  Hgb: 10.6  A/P PPD#1, s/p    - stable, desires d/c home

## 2022-01-23 NOTE — CARE PLAN
The patient is Stable - Low risk of patient condition declining or worsening    Shift Goals  Clinical Goals: stable VS and lochia WNL  Patient Goals: rest    Progress made toward(s) clinical / shift goals:    Pt reports comfort after pain interventions, Fundus firm and lochia light, VSS, educated on POC, needs met at this time. Questions answered. Will continue to educate.     Problem: Knowledge Deficit - Postpartum  Goal: Patient will verbalize and demonstrate understanding of self and infant care  Outcome: Progressing     Problem: Psychosocial - Postpartum  Goal: Patient will verbalize and demonstrate effective bonding and parenting behavior  Outcome: Progressing     Problem: Altered Physiologic Condition  Goal: Patient physiologically stable as evidenced by normal lochia, palpable uterine involution and vitals within normal limits  Outcome: Progressing

## 2022-01-23 NOTE — CARE PLAN
The patient is Stable - Low risk of patient condition declining or worsening    Shift Goals  Clinical Goals: rest, pain control, breastfeeding support, minimal bleeding, stable VS  Patient Goals: rest    Progress made toward(s) clinical / shift goals:  remain clinically stable  Problem: Pain - Standard  Goal: Alleviation of pain or a reduction in pain to the patient’s comfort goal  Outcome: Progressing  Note: Pain control at this time, will be medicated as needed.      Problem: Altered Physiologic Condition  Goal: Patient physiologically stable as evidenced by normal lochia, palpable uterine involution and vitals within normal limits  Outcome: Progressing  Note: Fundus firm at U, lochia rubra minimal. VSS       Patient is not progressing towards the following goals:

## 2022-01-24 NOTE — PROGRESS NOTES
This RN educated patient on discharge instructions and follow up appointments,   Patient's VSS and shows no distress. Discharge papers signed

## 2022-01-28 ENCOUNTER — OFFICE VISIT (OUTPATIENT)
Dept: OBGYN | Facility: CLINIC | Age: 27
End: 2022-01-28
Payer: COMMERCIAL

## 2022-01-28 DIAGNOSIS — O92.29 SORE NIPPLES DUE TO LACTATION: ICD-10-CM

## 2022-01-28 PROCEDURE — 99999 PR NO CHARGE: CPT | Performed by: NURSE PRACTITIONER

## 2022-01-28 NOTE — PROGRESS NOTES
Summary: Melina has been exclusively breastfeeding since birth. Breastfeeding every 2.5-3 hours throughout the day, sleeping one 4 hour stretch at night. Latching to both sides for most feedings, occasionally only taking one side. Taking 15-20 minutes on the first side, then shorter on the second breast. Pumped on day 3 and 4, after feeding to make sure milk was available. Yielded approximately 3oz between both breast at 3 separate sessions. Dad gives an occasional bottle of breastmilk if Melina is sleeping.    Today: Latched to both breast, cross cradle. Transferred 24mls from the left breast in 9 minutes and 4mls from the right breast in 6 minutes. Baby was not active on the second breast. Melina states she fed approximately 1 hour before today's appointment.   Plan: Breastfeed every 1.5-2.5 hours throughout the day, up to 4 hours throughout the night. Offer both breast for every feeding, up to 10-15 minutes on each side. Ok to start pumping as desired, after the first feeding of the day for 10-15 minutes. Offer occasional bottles, daily or a few times during the week for practice and familiarity.   Follow up:   Lactation appointment: As Needed   Pediatrician appointment: 2022  Referrals: None     Subjective:     Melina Alejandre is a 26 y.o. female here for lactation care. She is here today with babyNita.    Concerns: nipple pain at the beginning of the feeding     HPI:   Pertinent  history:   Mother does not have a history of advanced maternal age, GDM, hypertension prior to pregnancy, insulin resistance, multiple gestation, PCOS and thyroid disease. Common condition(s) which may interfere with milk supply.    Breast changes in pregnancy: Yes  History of breast surgeries: No      FEEDING HISTORY:    Past breastfeeding history: Second baby. First baby did not latch. Exclusively pumped for 3.5 months, reports healthy milk supply.   Hospital course: Exclusively .   Currently  1/28/2022: Breastfeeding every 2.5-3 hours throughout the day, sleeping one 4 hour stretch at night. Latching to both sides for most feedings, occasionally only taking one side. Taking 15-20 minutes on the first side, then shorter on the second breast. Pumped on day 3 and 4, after feeding to make sure milk was available. Yielded approximately 3oz between both breast at 3 separate sessions. Dad gives an occasional bottle of breastmilk if Melina is sleeping.     Both breasts: Yes  Bottle feeds: 0/24h    Supplement:   None     Nipple Shield Use: None    Breast Pumping:   Not pumping regularly   Frequency: None at this time  Type of pump: Jess, has Medela coming from insurance  Flange size/type: N/A  NO pain with pumping    Maternal ROS:  Constitutional: No fever, chills. Feeling well  Breasts: No soreness of breasts and Soreness of nipples  Psychiatric: Managing ok  Mental Health: No mention of feeling irritable, agitated, angry, overwhelmed, apathy, exhaustion nor having sleep changes outside infant feeds/demands or appetite changes     Objective:     Maternal Physical   General: No acute distress  Breasts: Symetrical , Soft, Plugged Duct - no evidence and Mastitis  - no S/S  Nipples: erythematous and across face of nipple, healing   Psychiatric: Normal mood and affect. Her behavior is normal. Judgment and thought content normal   Mental Health: Did NOT exhibit sadness, crying, feeling overwhelmed, agitation or hypervigilance.    Assessment/Plan & Lactation Counseling:     Infant exam on infant chart    Infant Weight History:   01/22/2022: 6# 3.5oz  01/26/2022: 5# 12.4oz  01/28/2022: 5# 15.2oz    Infant intake at Breast:  L   24mls   R  4mls    Total: 28mls  Milk Transfer at this feeding:   Effective breastfeeding left side  Pumped: Type of Pump: N/A      Initiation of Feeding: Infant initiates  Position of Feeding:    Right: cross cradle  Left: cross cradle  Attachment Achieved: rapidly  Nipple shield: N/A   Suck  Pattern at the breast: Suck burst and normal rest  Suck Pattern on the bottle: N/A  Behavior Following Observed Feeding: content  Nipple Pain: None today  Nipple Pain from: Shallow Latch     Latch: Assisted latch  Suckling/Feeding: attaches, baby falls asleep, baby fed effectively, baby roots, elicits BATSHEVA, intermittent swallows and rhythmic  Milk Supply Available: normal, establishing well day 6    Maternal Diagnosis/Problem:  Sore Nipples       BREASTFEEDING PLAN  Discussed concerns and symptoms as listed above in assessment and guidance summarized below.  Shared decision making was used between myself and the family for this encounter, home care, and follow up.  Topics reviewed included:  • Herbs and medications  A galactagogue is an herb or medication taken by a breastfeeding mother to increase her milk supply. We know that for centuries mothers around the world have sought out remedies to increase their milk supply. However, there is limited research on the safety and effectiveness of herbal galactagogues, which makes it hard for us to endorse them. It is not known if any of these herbs are truly effective, and it is difficult to predict how a specific herbal galactagogue will affect an individual, requiring “trial and error” in many situations. When effective, results are generally seen within 24-72 hours of starting an herbal galactagogue. Many of these herbs are used to decrease high blood sugar. If you are diabetic or have problems with low blood sugar, or thyroid disease  discuss the use of an herbal galactagogue with your health care provider. Not all women can increase their low milk supply with a galactagogue due to the many underlying causes of low milk production.  When taking a galactagogue, remember that frequent milk removal is still the most effective way to increase supply.    • Self -care through relational support and interaction.   o Encouraged  support, rest, getting out of the house each day,  walk or drive somewhere,  a coffee/tea at a drive through  o Allow others to bring you food, help with chores and errands, meeting for a cup of coffee  • Milk supply is dependent on glandular tissue development, hormonal influences, how many times the baby removes milk and how well the breasts are emptied in a 24 hour period. This is a biological reality that we can NOT work around. If, for any reason, your baby is not latching, or you are not able to nurse, then it is important for you to remove the milk instead by pumping or hand expression.  There's no magic trick, tea, food, drink, cookie or supplement that will increase your milk supply. One  must  effectively remove milk to continue to make and maximize milk. In the early days and weeks that can be 8+ times in 24 hours. For older babies, on average 6-7 + times in 24 hours.    • Feeding:   o Feed your baby every 1.5-2.5 hours, more often if baby acts hungry.   o Awaken baby for feeding if going over 2.5 hours in the day.   o Until back to birth weight, ONE four hour at night is acceptable if has had 8 prior feedings in 24 hours.    o Daily goal: 8-10 feedings per 24 hours.   • Supplement:   o No supplement is needed  o Can offer replacement or snack bottles as desired for practice and familiarity   • When bottle-feeding, there are three primary things to consider:    o Nipple Shape:  - Look for a nipple that looks like a “breast at work” not a “breast at rest.”  A “breast at work” has a somewhat cone shape as the nipple and breast tissue is pulled into the baby’s mouth while feeding.  Your baby’s mouth should be able to go around the widest part of the nipple to form a wide-open gape on the bottle like that of a good latch at the breast. In contrast, a breast at rest might look more like, well, a breast: a roundish base with a  nipple.  If the bottle looks like this, your baby’s lips may not be able to get around the widest part of the nipple because it  is just too wide resulting in a narrow gape that would hurt your nipple. This nipple shape may also make it difficult for your baby to make a complete seal with their lips which leads to air intake and milk spillage.Wide or narrow neck bottles are your choice.   o Flow Rate of the Nipple:  - The nipple flow should be slow.  Don’t just read the label, but notice how your baby is feeding and trust what you observe.  A study done a few years ago found that “slow flow” varied widely between brands and even between nipples of the same brand.  Try several nipples until you find one that results in a rhythmic sucking pattern but not chugging and gulping.  o Pacing the feeding:  - A slow flow nipple helps, but how you feed the baby is more important.  Good positioning can compensate for a faster flow nipple.  When bottle-feeding, the baby should control how much is consumed at a feeding.  Holding the baby in an upright position with the bottle horizontal ensures that the baby gets milk only when sucking.  Here is a nice video demonstrating this concept of paced bottle feeding,  https://www.zwoor.com.com/watch?v=NmHJZ7sRV9F  • Pacifier Use:  The American [employer]y of Ped[employer]itians' Position Paper reports: Although we recommend a conservative approach regarding pacifier use, we do not endorse a complete ban on the use of pacifiers, nor do we support an approach that induces parental guilt concerning their choices about the use of pacifiers.  • Positioning Techniques   o Suggested positions Cross cradle  o Fine tune position by making sure your fingers beneath the breast as well as your bra, are out of the way of your baby's chin.  o Positioning:  Many positions shown, great sidelying at 7 minutes.   o See http://globalhealthmedia.org/portfolio-items/positions-for-breastfeeding/?mvcbubocpCK=20654  • Latch on Techniques   o Fine tune latch:  - By holding your baby more securely at the breast, assisting your baby to stay attached  "by:  • Bringing your baby to your breast, not breast to the baby  • Your baby's cheek to touch breast securely, nose tipped back  • Hold your baby firmly in place so when your baby forgets to suck and picks it back up again your baby is in the correct spot. You will be extinguishing behavior and replacing it with a deeper latch to stimulate suck and provide satisfaction at the breast.  • Your baby needs as much breast as deep in the mouth as possible to allow your nipples to heal and for you more importantly to maximize efficiency at the breast  o Latch is asymmetrical, leading with the chin, getting the baby below the breast, as if offering a large \"deli edgard sandwich\".  • Pumping Guidelines:  o Both breasts   o Pump 1 times in 24 hours AS DESIRED   o Type of pump:  • Medela and Jess   o Always double pump  o How long will vary woman to woman, typically 8-15 minutes, or 1-2 minutes after flow slows  o Flange Fit: Freely moving nipple in the tunnel with some movement of the areola.    • Storage (Acceptable guidelines for healthy term babies)  o 10 hours at room temp including your pieces, may rinse but not mandatory  o 8 days refrigerator, don't need  to refrigerate right away if using fresh pumped milk at the next feeding  o Freezer 1 year  o Deep freezer 2 years  o American Academy or Pediatrics has said you may mix different temperature milks.   o If baby drinks breastmilk from a fresh or refrigerated bottle of breastmilk,  you may return the unused portion to the refrigerator and use once at next feeding.       • Connect with other mothers:  o Facebook:   • [place] [facility]: https://www.facebook.com/[place].breastfeeds/  • Well-Nourished Babies (Private group for questions and support): https://www.facebook.com/groups/786277449229326/  o Breastfeeding [place]  • Tuesdays 10am - 11am. Women's Health at [address], 3rd floor conference room  • Come and check your baby's weight, do a feeding " and see how your baby is growing, visit with other mothers, plan on a walk or coffee date after group.  o Please wear a mask  o Due to space limitations - no strollers please (Fresh c/section moms should use the stroller)  o We would love to have dads stay, but moms won't breastfeed.  o The room is only available from 10am -11am, there is a meeting prior and after.   o All diapers must be taken with you   o Breastfeeding [place] [facility]  - This is an emotional, informational and safe support [place] with your like-minded community that builds solidarity among moms  - The honest experiences of mothers are highly valued among the other mothers  - Tuesday  at 11am by Zoom,  you will be sent an invitation each week, please unsubscribe as you wish  - You may instead copy and paste this link: [email or web address]    - Passcode  [id number]  - You may share this link with friends; please don't post on social media.      In Conclusion:   Family present has verbalized what they can realistically do based on family dynamics, understanding a plan has to be doable to be effective and can be renegotiated at any time.  This is a complex and intimate journey. When obstacles present themselves, it takes confidence, persistence and support. You are now the focus of our Breastfeeding Medicine team; we are here to support your decisions and goals.      Follow up requires close monitoring in this time sensitive window of opportunity to establish milk supply and facilitate the learning of  breastfeeding.    Mom is encouraged to e-mail to update how the plan is working.    Pediatrician appointment: February 4, 2022    Follow-up for infant weight check and dyad breastfeeding evaluation As Needed   Please call [phone] if you have not scheduled your next appointment    A total of 45 minutes, not including infant assessment time, with more than 50% was spent preparing to see the  patient, obtaining and reviewing separately obtained history, performing a medically appropriate examination and evaluation, counseling and educating the family, referring and communicating with other health care professionals, documenting clinical information in the electronic health record, independently interpreting weighted feeds and infant growth results, communicating these results to the family and care coordination as detailed in the above note.    Obey ROACH, IBCLC     Jayne Baires

## 2022-10-05 ENCOUNTER — OFFICE VISIT (OUTPATIENT)
Dept: MEDICAL GROUP | Facility: MEDICAL CENTER | Age: 27
End: 2022-10-05
Payer: COMMERCIAL

## 2022-10-05 VITALS
BODY MASS INDEX: 27.66 KG/M2 | SYSTOLIC BLOOD PRESSURE: 100 MMHG | HEIGHT: 63 IN | DIASTOLIC BLOOD PRESSURE: 66 MMHG | HEART RATE: 96 BPM | WEIGHT: 156.09 LBS | OXYGEN SATURATION: 95 % | TEMPERATURE: 97.9 F

## 2022-10-05 DIAGNOSIS — N91.2 AMENORRHEA: ICD-10-CM

## 2022-10-05 DIAGNOSIS — Z23 IMMUNIZATION DUE: ICD-10-CM

## 2022-10-05 DIAGNOSIS — R21 RASH AND NONSPECIFIC SKIN ERUPTION: ICD-10-CM

## 2022-10-05 DIAGNOSIS — Z13.6 SCREENING FOR CARDIOVASCULAR CONDITION: ICD-10-CM

## 2022-10-05 DIAGNOSIS — Z00.00 ENCOUNTER FOR MEDICAL EXAMINATION TO ESTABLISH CARE: ICD-10-CM

## 2022-10-05 PROBLEM — Z32.01 POSITIVE PREGNANCY TEST: Status: RESOLVED | Noted: 2021-03-23 | Resolved: 2022-10-05

## 2022-10-05 PROBLEM — N89.8 VAGINAL ITCHING: Status: RESOLVED | Noted: 2019-12-03 | Resolved: 2022-10-05

## 2022-10-05 PROBLEM — N92.6 ABNORMAL MENSES: Status: RESOLVED | Noted: 2019-12-03 | Resolved: 2022-10-05

## 2022-10-05 PROCEDURE — 99213 OFFICE O/P EST LOW 20 MIN: CPT | Performed by: STUDENT IN AN ORGANIZED HEALTH CARE EDUCATION/TRAINING PROGRAM

## 2022-10-05 RX ORDER — TRIAMCINOLONE ACETONIDE 1 MG/G
CREAM TOPICAL NIGHTLY
COMMUNITY
Start: 2018-10-10

## 2022-10-05 NOTE — LETTER
[employer] [employer]  Jayne Chung M.D.  [address]  [place] [address]  Fax: [phone]   Authorization for Release/Disclosure of   Protected Health Information   Name: OLIVERIO SORTO : 1995 SSN: [id number]   Address: [address] [place] [address]  [place] [address] Phone:    [phone] (home) [phone] (work)   I authorize the entity listed below to release/disclose the PHI below to:   [facility]/Jayen Chung M.D. and Jayne Chung M.D.   Provider or Entity Name:  Estrella Valadez MD   Address   [facility]  [address], [place] [address] Phone:   [phone]    Fax:     Reason for request: continuity of care   Information to be released:    [  ] LAST COLONOSCOPY,  including any PATH REPORT and follow-up  [  ] LAST FIT/COLOGUARD RESULT [  ] LAST DEXA  [  ] LAST MAMMOGRAM  [XX  ] LAST PAP  [  ] LAST LABS [  ] RETINA EXAM REPORT  [  ] IMMUNIZATION RECORDS  [  ] Release all info      [  ] Check here and initial the line next to each item to release ALL health information INCLUDING  _____ Care and treatment for drug and / or alcohol abuse  _____ HIV testing, infection status, or AIDS  _____ Genetic Testing    DATES OF SERVICE OR TIME PERIOD TO BE DISCLOSED: _____________  I understand and acknowledge that:  * This Authorization may be revoked at any time by you in writing, except if your health information has already been used or disclosed.  * Your health information that will be used or disclosed as a result of you signing this authorization could be re-disclosed by the recipient. If this occurs, your re-disclosed health information may no longer be protected by State or Federal laws.  * You may refuse to sign this Authorization. Your refusal will not affect your ability to obtain treatment.  * This Authorization becomes effective upon signing and will  on (date) __________.      If no date is indicated, this Authorization will  one (1) year from  the signature date.    Name: Melina Alejandre    Signature:   Date:     10/5/2022       PLEASE FAX REQUESTED RECORDS BACK TO: [phone]

## 2022-10-05 NOTE — PROGRESS NOTES
"Subjective:     CC:  Diagnoses of Screening for cardiovascular condition, BMI 27.0-27.9,adult, Amenorrhea, Rash and nonspecific skin eruption, and Immunization due were pertinent to this visit.    HISTORY OF THE PRESENT ILLNESS: Patient is a 26 y.o. female. This pleasant patient is here today to establish care and discuss the following    Problem   Amenorrhea    7/26/22 LMP, OB appointment tomorrow, is taking prenatal, positive urine pregnancy at home     Rash and Nonspecific Skin Eruption    Rash on the ears bilaterally, in and around the superficial ear canal.  She has been using Kenalog cream as needed but the itching.  It is manageable with the Kenalog only.     Indication for Care in Labor Or Delivery (Resolved)   Positive Pregnancy Test (Resolved)   Abnormal Menses (Resolved)   Vaginal Itching (Resolved)   Mid Back Pain (Resolved)   Cough (Resolved)   Chronic Pain of Right Ankle (Resolved)   Dermatitis (Resolved)   Acne (Resolved)       Health Maintenance: Completed    ROS:   ROS      Objective:     Exam: /66 (BP Location: Left arm, Patient Position: Sitting, BP Cuff Size: Adult)   Pulse 96   Temp 36.6 °C (97.9 °F) (Temporal)   Ht 1.6 m (5' 3\")   Wt 70.8 kg (156 lb 1.4 oz)   SpO2 95%  Body mass index is 27.65 kg/m².    Physical Exam  Vitals reviewed.   Constitutional:       General: She is not in acute distress.     Appearance: She is normal weight. She is not toxic-appearing.   HENT:      Head: Normocephalic and atraumatic.      Right Ear: Tympanic membrane, ear canal and external ear normal. There is no impacted cerumen.      Left Ear: Tympanic membrane, ear canal and external ear normal. There is no impacted cerumen.      Ears:      Comments: White scaling on a pink base in the ears bilaterally and in surrounding the external ear canal  Eyes:      General:         Right eye: No discharge.         Left eye: No discharge.      Extraocular Movements: Extraocular movements intact.      " Conjunctiva/sclera: Conjunctivae normal.   Cardiovascular:      Rate and Rhythm: Normal rate and regular rhythm.      Pulses: Normal pulses.      Heart sounds: Normal heart sounds. No murmur heard.  Pulmonary:      Effort: Pulmonary effort is normal. No respiratory distress.      Breath sounds: Normal breath sounds.   Abdominal:      General: Abdomen is flat. Bowel sounds are normal. There is no distension.      Palpations: Abdomen is soft.      Tenderness: There is no abdominal tenderness.   Musculoskeletal:         General: Normal range of motion.   Skin:     General: Skin is warm and dry.      Capillary Refill: Capillary refill takes less than 2 seconds.   Neurological:      Mental Status: She is alert.   Psychiatric:         Mood and Affect: Mood normal.         Behavior: Behavior normal.         Thought Content: Thought content normal.         Judgment: Judgment normal.       Assessment & Plan:   26 y.o. female with the following -    1. Encounter for medical examination to establish care   history, problem list, medications and allergies reviewed.    2. BMI 27.0-27.9,adult  Family history of diabetes.  Screen for insulin resistance.  - HEMOGLOBIN A1C; Future    3. Amenorrhea  Keep appointment with OB/GYN tomorrow    4. Rash and nonspecific skin eruption  Continue to use Kenalog cream.  This rash looks like it could be eczema versus psoriasis.  If it is well controlled with Kenalog cream there is nothing more to do.  Advised patient that if it spreads to other parts of her body to come back in for evaluation.    5. Immunization due  Patient is due for HPV vaccine and influenza vaccine.  She would like to discuss this with her OB/GYN tomorrow prior to getting these.  Advised that she can make an MA visit to get these done if she likes after talking to her OB/GYN.    6. Screening for cardiovascular condition  - Lipid Profile; Future    Return if symptoms worsen or fail to improve.    Please note that this  dictation was created using voice recognition software. I have made every reasonable attempt to correct obvious errors, but I expect that there are errors of grammar and possibly content that I did not discover before finalizing the note.

## 2022-10-18 ENCOUNTER — GYNECOLOGY VISIT (OUTPATIENT)
Dept: OBGYN | Facility: CLINIC | Age: 27
End: 2022-10-18
Payer: COMMERCIAL

## 2022-10-18 DIAGNOSIS — O92.5 LACTATION SUPPRESSION: ICD-10-CM

## 2022-10-18 PROCEDURE — 99215 OFFICE O/P EST HI 40 MIN: CPT | Performed by: NURSE PRACTITIONER

## 2022-10-18 NOTE — PROGRESS NOTES
Summary: Melina is 9 weeks 5 days pregnant with baby #3. Breastfeeding on demand, every 1-3 hours, offering one side at each feeding. Pumping every 3 hours at work. Was yielding 10-12oz now yielding 4-5oz from all pump session. Baby eating solids well, seems happy and content. Baby was sleeping 5-6 hours at night, now waking every 2.5-3 hours throughout the night.    Today: Latched to both breast, cradle. Transferred 15mls from both sides. Baby very distracted and playful.    Plan: Continue to breastfeed on demand when home with the baby. Suggested topping off with 6oz in the evening to top off baby to attempt to get longer stretches during the night. Supplement with frozen milk, freeze dried milk and/or formula when working to make up the difference between volume pumped and what baby needs.   Follow up:   Lactation appointment: November 15, 2022  Pediatrician appointment: 2022   Referrals: None     Subjective:     Melina Alejandre is a 26 y.o. female here for lactation care. She is here today with  babyNita .    Concerns: Supply evaluation while pregnant     HPI:   Pertinent  history:   Mother does not have a history of advanced maternal age, GDM, hypertension prior to pregnancy, insulin resistance, multiple gestation, PCOS and thyroid disease. Common condition(s) which may interfere with milk supply.    Breast changes in pregnancy: Yes  History of breast surgeries: No      FEEDING HISTORY:    Past breastfeeding history: Second baby. First baby did not latch. Exclusively pumped for 3.5 months, reports healthy milk supply.   Hospital course: Exclusively .   Prior to consultation on 2022: Breastfeeding every 2.5-3 hours throughout the day, sleeping one 4 hour stretch at night. Latching to both sides for most feedings, occasionally only taking one side. Taking 15-20 minutes on the first side, then shorter on the second breast. Pumped on day 3 and 4, after feeding to make  sure milk was available. Yielded approximately 3oz between both breast at 3 separate sessions. Dad gives an occasional bottle of breastmilk if Melina is sleeping.   Currently 10/18/2022: Melina is 9 weeks 5 days pregnant with baby #3. Breastfeeding on demand, every 1-3 hours, offering one side at each feeding. Pumping every 3 hours at work. Was yielding 10-12oz now yielding 4-5oz from all pump session. Baby eating solids well, seems happy and content. Baby was sleeping 5-6 hours at night, now waking every 2.5-3 hours throughout the night.     Both breasts: Yes  Bottle feeds: 3-5/24h when working    Supplement:   None     Nipple Shield Use: None    Breast Pumping:   Not pumping regularly   Frequency: None at this time  Type of pump: Jess, has Medela coming from insurance  Flange size/type: N/A  NO pain with pumping    Maternal ROS:  Constitutional: No fever, chills. Feeling well  Breasts: No soreness of breasts and Soreness of nipples  Psychiatric: Managing ok  Mental Health: No mention of feeling irritable, agitated, angry, overwhelmed, apathy, exhaustion nor having sleep changes outside infant feeds/demands or appetite changes     Objective:     Maternal Physical   General: No acute distress  Breasts: Symetrical , Soft, Plugged Duct - no evidence and Mastitis  - no S/S  Nipples: erythematous and across face of nipple, healing   Psychiatric: Normal mood and affect. Her behavior is normal. Judgment and thought content normal   Mental Health: Did NOT exhibit sadness, crying, feeling overwhelmed, agitation or hypervigilance.    Assessment/Plan & Lactation Counseling:     Infant exam on infant chart    Infant Weight History:   01/22/2022: 6# 3.5oz  01/26/2022: 5# 12.4oz  01/28/2022: 5# 15.2oz  10/18/2022: 15# 8.4oz    Infant intake at Breast:  L  10mls   R  5mls    Total: 15mls  Milk Transfer at this feeding:   Ineffective breastfeeding, baby distracted and playful   Pumped: Type of Pump: N/A      Initiation of  Feeding: Infant initiates  Position of Feeding:    Right: cross cradle  Left: cross cradle  Attachment Achieved: rapidly  Nipple shield: N/A   Suck Pattern at the breast: Suck burst and normal rest  Suck Pattern on the bottle: N/A  Behavior Following Observed Feeding: content and playful  Nipple Pain: None today  Nipple Pain from: Shallow Latch     Latch: Assisted latch  Suckling/Feeding: attaches, baby falls asleep, baby fed effectively, baby roots, elicits BATSHEVA, intermittent swallows and rhythmic  Milk Supply Available: Decreased  Low Supply Due to: Current pregnancy     Maternal Diagnosis/Problem:  Lactation Suppression        BREASTFEEDING PLAN  Discussed concerns and symptoms as listed above in assessment and guidance summarized below.  Shared decision making was used between myself and the family for this encounter, home care, and follow up.  Topics reviewed included:  Milk supply will continue to monitor to determine when to increase supplement to meet baby's needs.     Feeding:   Feed on demand.  Attempt to offer both sides when possible.    Supplement:   Offer 6oz, two 3oz bottles in the evening leading up to bedtime.   Pumping Guidelines:  Both breasts   Continue to pump every 3 hours when working    Type of pump:  Medela and Jess    Always double pump  How long will vary woman to woman, typically 8-15 minutes, or 1-2 minutes after flow slows  Flange Fit: Freely moving nipple in the tunnel with some movement of the areola.    Storage (Acceptable guidelines for healthy term babies)  10 hours at room temp including your pieces, may rinse but not mandatory  8 days refrigerator, don't need  to refrigerate right away if using fresh pumped milk at the next feeding  Freezer 1 year  Deep freezer 2 years  American Academy or Pediatrics has said you may mix different temperature milks.   If baby drinks breastmilk from a fresh or refrigerated bottle of breastmilk,  you may return the unused portion to the  refrigerator and use once at next feeding.       Connect with other mothers:  Facebook:   [place] [facility]: [email or web address]://[email or web address]/[email or web address].[email or web address]  Well-Nourished Babies (Private group for questions and support): https://www.facebook.com/groups/289921690396814/  [facility] [place] [facility]  Tuesdays 10am - 11am. [facility]'s [facility] at [facility], [address] [place] [address], 3rd floor conference room  Come and check your baby's weight, do a feeding and see how your baby is growing, visit with other mothers, plan on a walk or coffee date after group.  Please wear a mask  Due to space limitations - no strollers please (Fresh c/section moms should use the stroller)  We would love to have dads stay, but moms won't breastfeed.  The room is only available from 10am -11am, there is a meeting prior and after.   All diapers must be taken with you   [facility] [place] [facility]  This is an emotional, informational and safe support [place] with your like-minded community that builds solidarity among moms  The honest experiences of mothers are highly valued among the other mothers  Tuesday  at 11am by [place],  you will be sent an invitation each week, please unsubscribe as you wish  You may instead copy and paste this link: [email or web address]    Passcode  [id number]  You may share this link with friends; please don't post on social media.      In Conclusion:   Family present has verbalized what they can realistically do based on family dynamics, understanding a plan has to be doable to be effective and can be renegotiated at any time.  This is a complex and intimate journey. When obstacles present themselves, it takes confidence, persistence and support. You are now the focus of our Breastfeeding Medicine team; we are here to support your decisions and goals.      Follow up requires close monitoring in this time sensitive window of opportunity to establish milk supply and facilitate  the learning of  breastfeeding.    Mom is encouraged to e-mail to update how the plan is working.    Pediatrician appointment: October 24, 2022    Follow-up for infant weight check and dyad breastfeeding evaluation in 1 Month  Please call [phone] if you have not scheduled your next appointment    A total of 45 minutes, including infant assessment time, with more than 50% was spent preparing to see the patient, obtaining and reviewing separately obtained history, performing a medically appropriate examination and evaluation, counseling and educating the family, referring and communicating with other health care professionals, documenting clinical information in the electronic health record, independently interpreting weighted feeds and infant growth results, communicating these results to the family and care coordination as detailed in the above note.       Jayne Baires

## 2022-11-15 ENCOUNTER — APPOINTMENT (OUTPATIENT)
Dept: OBGYN | Facility: CLINIC | Age: 27
End: 2022-11-15
Payer: COMMERCIAL

## 2023-01-16 ENCOUNTER — HOSPITAL ENCOUNTER (OUTPATIENT)
Facility: MEDICAL CENTER | Age: 28
End: 2023-01-16
Attending: OBSTETRICS & GYNECOLOGY | Admitting: OBSTETRICS & GYNECOLOGY
Payer: COMMERCIAL

## 2023-02-11 ENCOUNTER — HOSPITAL ENCOUNTER (OUTPATIENT)
Facility: MEDICAL CENTER | Age: 28
End: 2023-02-12
Attending: OBSTETRICS & GYNECOLOGY | Admitting: OBSTETRICS & GYNECOLOGY
Payer: COMMERCIAL

## 2023-02-11 DIAGNOSIS — O23.43 URINARY TRACT INFECTION AFFECTING CARE OF MOTHER IN THIRD TRIMESTER, ANTEPARTUM: ICD-10-CM

## 2023-02-11 DIAGNOSIS — Z34.93 THIRD TRIMESTER PREGNANCY: ICD-10-CM

## 2023-02-11 DIAGNOSIS — N12 PYELONEPHRITIS: ICD-10-CM

## 2023-02-11 LAB
ALBUMIN SERPL BCP-MCNC: 3.5 G/DL (ref 3.2–4.9)
ALBUMIN/GLOB SERPL: 1 G/DL
ALP SERPL-CCNC: 89 U/L (ref 30–99)
ALT SERPL-CCNC: 5 U/L (ref 2–50)
ANION GAP SERPL CALC-SCNC: 16 MMOL/L (ref 7–16)
APPEARANCE UR: ABNORMAL
AST SERPL-CCNC: 12 U/L (ref 12–45)
BACTERIA #/AREA URNS HPF: ABNORMAL /HPF
BASOPHILS # BLD AUTO: 0 % (ref 0–1.8)
BASOPHILS # BLD: 0 K/UL (ref 0–0.12)
BILIRUB SERPL-MCNC: <0.2 MG/DL (ref 0.1–1.5)
BILIRUB UR QL STRIP.AUTO: ABNORMAL
BUN SERPL-MCNC: 7 MG/DL (ref 8–22)
CALCIUM ALBUM COR SERPL-MCNC: 9 MG/DL (ref 8.5–10.5)
CALCIUM SERPL-MCNC: 8.6 MG/DL (ref 8.5–10.5)
CHLORIDE SERPL-SCNC: 99 MMOL/L (ref 96–112)
CO2 SERPL-SCNC: 18 MMOL/L (ref 20–33)
COLOR UR: ABNORMAL
CREAT SERPL-MCNC: 0.64 MG/DL (ref 0.5–1.4)
EOSINOPHIL # BLD AUTO: 0.48 K/UL (ref 0–0.51)
EOSINOPHIL NFR BLD: 2.5 % (ref 0–6.9)
EPI CELLS #/AREA URNS HPF: ABNORMAL /HPF
ERYTHROCYTE [DISTWIDTH] IN BLOOD BY AUTOMATED COUNT: 37.5 FL (ref 35.9–50)
FLUAV RNA SPEC QL NAA+PROBE: NEGATIVE
FLUBV RNA SPEC QL NAA+PROBE: NEGATIVE
GFR SERPLBLD CREATININE-BSD FMLA CKD-EPI: 125 ML/MIN/1.73 M 2
GLOBULIN SER CALC-MCNC: 3.5 G/DL (ref 1.9–3.5)
GLUCOSE SERPL-MCNC: 172 MG/DL (ref 65–99)
GLUCOSE UR STRIP.AUTO-MCNC: NEGATIVE MG/DL
HCT VFR BLD AUTO: 42 % (ref 37–47)
HGB BLD-MCNC: 14 G/DL (ref 12–16)
HYALINE CASTS #/AREA URNS LPF: ABNORMAL /LPF
KETONES UR STRIP.AUTO-MCNC: NEGATIVE MG/DL
LACTATE SERPL-SCNC: 1.5 MMOL/L (ref 0.5–2)
LACTATE SERPL-SCNC: 1.9 MMOL/L (ref 0.5–2)
LACTATE SERPL-SCNC: 2.1 MMOL/L (ref 0.5–2)
LACTATE SERPL-SCNC: 2.4 MMOL/L (ref 0.5–2)
LACTATE SERPL-SCNC: 2.6 MMOL/L (ref 0.5–2)
LACTATE SERPL-SCNC: 2.8 MMOL/L (ref 0.5–2)
LEUKOCYTE ESTERASE UR QL STRIP.AUTO: ABNORMAL
LIPASE SERPL-CCNC: 25 U/L (ref 11–82)
LYMPHOCYTES # BLD AUTO: 7.3 K/UL (ref 1–4.8)
LYMPHOCYTES NFR BLD: 37.8 % (ref 22–41)
MANUAL DIFF BLD: NORMAL
MCH RBC QN AUTO: 26.7 PG (ref 27–33)
MCHC RBC AUTO-ENTMCNC: 33.3 G/DL (ref 33.6–35)
MCV RBC AUTO: 80.2 FL (ref 81.4–97.8)
METAMYELOCYTES NFR BLD MANUAL: 2.5 %
MICRO URNS: ABNORMAL
MONOCYTES # BLD AUTO: 0.33 K/UL (ref 0–0.85)
MONOCYTES NFR BLD AUTO: 1.7 % (ref 0–13.4)
MORPHOLOGY BLD-IMP: NORMAL
NEUTROPHILS # BLD AUTO: 10.71 K/UL (ref 2–7.15)
NEUTROPHILS NFR BLD: 55.5 % (ref 44–72)
NITRITE UR QL STRIP.AUTO: POSITIVE
NRBC # BLD AUTO: 0 K/UL
NRBC BLD-RTO: 0 /100 WBC
PH UR STRIP.AUTO: 5.5 [PH] (ref 5–8)
PLATELET # BLD AUTO: 362 K/UL (ref 164–446)
PLATELET BLD QL SMEAR: NORMAL
PMV BLD AUTO: 10.8 FL (ref 9–12.9)
POTASSIUM SERPL-SCNC: 3.4 MMOL/L (ref 3.6–5.5)
PROT SERPL-MCNC: 7 G/DL (ref 6–8.2)
PROT UR QL STRIP: 300 MG/DL
RBC # BLD AUTO: 5.24 M/UL (ref 4.2–5.4)
RBC # URNS HPF: ABNORMAL /HPF
RBC BLD AUTO: NORMAL
RBC UR QL AUTO: NEGATIVE
RSV RNA SPEC QL NAA+PROBE: NEGATIVE
SARS-COV-2 RNA RESP QL NAA+PROBE: NOTDETECTED
SODIUM SERPL-SCNC: 133 MMOL/L (ref 135–145)
SP GR UR STRIP.AUTO: 1.03
SPECIMEN SOURCE: NORMAL
UROBILINOGEN UR STRIP.AUTO-MCNC: 1 MG/DL
WBC # BLD AUTO: 19.3 K/UL (ref 4.8–10.8)
WBC #/AREA URNS HPF: ABNORMAL /HPF

## 2023-02-11 PROCEDURE — 99285 EMERGENCY DEPT VISIT HI MDM: CPT

## 2023-02-11 PROCEDURE — 36415 COLL VENOUS BLD VENIPUNCTURE: CPT

## 2023-02-11 PROCEDURE — 87086 URINE CULTURE/COLONY COUNT: CPT

## 2023-02-11 PROCEDURE — 700111 HCHG RX REV CODE 636 W/ 250 OVERRIDE (IP): Performed by: EMERGENCY MEDICINE

## 2023-02-11 PROCEDURE — 83690 ASSAY OF LIPASE: CPT

## 2023-02-11 PROCEDURE — 80053 COMPREHEN METABOLIC PANEL: CPT

## 2023-02-11 PROCEDURE — 85007 BL SMEAR W/DIFF WBC COUNT: CPT

## 2023-02-11 PROCEDURE — 700105 HCHG RX REV CODE 258: Performed by: OBSTETRICS & GYNECOLOGY

## 2023-02-11 PROCEDURE — A9270 NON-COVERED ITEM OR SERVICE: HCPCS | Performed by: OBSTETRICS & GYNECOLOGY

## 2023-02-11 PROCEDURE — 0241U HCHG SARS-COV-2 COVID-19 NFCT DS RESP RNA 4 TRGT MIC: CPT

## 2023-02-11 PROCEDURE — 96375 TX/PRO/DX INJ NEW DRUG ADDON: CPT

## 2023-02-11 PROCEDURE — 302790 HCHG STAT ANTEPARTUM CARE, DAILY

## 2023-02-11 PROCEDURE — 83605 ASSAY OF LACTIC ACID: CPT | Mod: 91

## 2023-02-11 PROCEDURE — 85025 COMPLETE CBC W/AUTO DIFF WBC: CPT

## 2023-02-11 PROCEDURE — 96374 THER/PROPH/DIAG INJ IV PUSH: CPT

## 2023-02-11 PROCEDURE — 81001 URINALYSIS AUTO W/SCOPE: CPT

## 2023-02-11 PROCEDURE — G0378 HOSPITAL OBSERVATION PER HR: HCPCS

## 2023-02-11 PROCEDURE — 87040 BLOOD CULTURE FOR BACTERIA: CPT

## 2023-02-11 PROCEDURE — 700102 HCHG RX REV CODE 250 W/ 637 OVERRIDE(OP): Performed by: OBSTETRICS & GYNECOLOGY

## 2023-02-11 PROCEDURE — 700105 HCHG RX REV CODE 258: Performed by: EMERGENCY MEDICINE

## 2023-02-11 PROCEDURE — C9803 HOPD COVID-19 SPEC COLLECT: HCPCS | Performed by: EMERGENCY MEDICINE

## 2023-02-11 RX ORDER — POTASSIUM CHLORIDE 20 MEQ/1
20 TABLET, EXTENDED RELEASE ORAL ONCE
Status: COMPLETED | OUTPATIENT
Start: 2023-02-11 | End: 2023-02-11

## 2023-02-11 RX ORDER — DOCUSATE SODIUM 100 MG/1
100 CAPSULE, LIQUID FILLED ORAL 2 TIMES DAILY
Status: DISCONTINUED | OUTPATIENT
Start: 2023-02-11 | End: 2023-02-12 | Stop reason: HOSPADM

## 2023-02-11 RX ORDER — DIPHENHYDRAMINE HYDROCHLORIDE 50 MG/ML
25 INJECTION INTRAMUSCULAR; INTRAVENOUS ONCE
Status: COMPLETED | OUTPATIENT
Start: 2023-02-11 | End: 2023-02-11

## 2023-02-11 RX ORDER — SODIUM CHLORIDE 9 MG/ML
1000 INJECTION, SOLUTION INTRAVENOUS ONCE
Status: COMPLETED | OUTPATIENT
Start: 2023-02-11 | End: 2023-02-11

## 2023-02-11 RX ORDER — VITAMIN A ACETATE, BETA CAROTENE, ASCORBIC ACID, CHOLECALCIFEROL, .ALPHA.-TOCOPHEROL ACETATE, DL-, THIAMINE MONONITRATE, RIBOFLAVIN, NIACINAMIDE, PYRIDOXINE HYDROCHLORIDE, FOLIC ACID, CYANOCOBALAMIN, CALCIUM CARBONATE, FERROUS FUMARATE, ZINC OXIDE, CUPRIC OXIDE 3080; 12; 120; 400; 1; 1.84; 3; 20; 22; 920; 25; 200; 27; 10; 2 [IU]/1; UG/1; MG/1; [IU]/1; MG/1; MG/1; MG/1; MG/1; MG/1; [IU]/1; MG/1; MG/1; MG/1; MG/1; MG/1
1 TABLET, FILM COATED ORAL
Status: DISCONTINUED | OUTPATIENT
Start: 2023-02-11 | End: 2023-02-12 | Stop reason: HOSPADM

## 2023-02-11 RX ORDER — CEFTRIAXONE 1 G/1
1000 INJECTION, POWDER, FOR SOLUTION INTRAMUSCULAR; INTRAVENOUS ONCE
Status: COMPLETED | OUTPATIENT
Start: 2023-02-11 | End: 2023-02-11

## 2023-02-11 RX ORDER — SODIUM CHLORIDE, SODIUM LACTATE, POTASSIUM CHLORIDE, CALCIUM CHLORIDE 600; 310; 30; 20 MG/100ML; MG/100ML; MG/100ML; MG/100ML
INJECTION, SOLUTION INTRAVENOUS CONTINUOUS
Status: DISCONTINUED | OUTPATIENT
Start: 2023-02-11 | End: 2023-02-12 | Stop reason: HOSPADM

## 2023-02-11 RX ADMIN — DIPHENHYDRAMINE HYDROCHLORIDE 25 MG: 50 INJECTION INTRAMUSCULAR; INTRAVENOUS at 04:56

## 2023-02-11 RX ADMIN — PRENATAL WITH FERROUS FUM AND FOLIC ACID 1 TABLET: 3080; 920; 120; 400; 22; 1.84; 3; 20; 10; 1; 12; 200; 27; 25; 2 TABLET ORAL at 12:11

## 2023-02-11 RX ADMIN — POTASSIUM CHLORIDE 20 MEQ: 1500 TABLET, EXTENDED RELEASE ORAL at 12:11

## 2023-02-11 RX ADMIN — SODIUM CHLORIDE, POTASSIUM CHLORIDE, SODIUM LACTATE AND CALCIUM CHLORIDE: 600; 310; 30; 20 INJECTION, SOLUTION INTRAVENOUS at 12:05

## 2023-02-11 RX ADMIN — CEFTRIAXONE SODIUM 1000 MG: 1 INJECTION, POWDER, FOR SOLUTION INTRAMUSCULAR; INTRAVENOUS at 06:01

## 2023-02-11 RX ADMIN — SODIUM CHLORIDE 1000 ML: 9 INJECTION, SOLUTION INTRAVENOUS at 08:14

## 2023-02-11 RX ADMIN — SODIUM CHLORIDE 1000 ML: 9 INJECTION, SOLUTION INTRAVENOUS at 06:00

## 2023-02-11 ASSESSMENT — LIFESTYLE VARIABLES
TOTAL SCORE: 0
EVER HAD A DRINK FIRST THING IN THE MORNING TO STEADY YOUR NERVES TO GET RID OF A HANGOVER: NO
ALCOHOL_USE: NO
HAVE YOU EVER FELT YOU SHOULD CUT DOWN ON YOUR DRINKING: NO
DOES PATIENT WANT TO STOP DRINKING: NO
EVER FELT BAD OR GUILTY ABOUT YOUR DRINKING: NO
EVER_SMOKED: NEVER
TOTAL SCORE: 0
CONSUMPTION TOTAL: INCOMPLETE
HAVE PEOPLE ANNOYED YOU BY CRITICIZING YOUR DRINKING: NO
TOTAL SCORE: 0

## 2023-02-11 ASSESSMENT — COPD QUESTIONNAIRES
DO YOU EVER COUGH UP ANY MUCUS OR PHLEGM?: NO/ONLY WITH OCCASIONAL COLDS OR INFECTIONS
COPD SCREENING SCORE: 0
DURING THE PAST 4 WEEKS HOW MUCH DID YOU FEEL SHORT OF BREATH: NONE/LITTLE OF THE TIME
HAVE YOU SMOKED AT LEAST 100 CIGARETTES IN YOUR ENTIRE LIFE: NO/DON'T KNOW
IN THE PAST 12 MONTHS DO YOU DO LESS THAN YOU USED TO BECAUSE OF YOUR BREATHING PROBLEMS: DISAGREE/UNSURE

## 2023-02-11 ASSESSMENT — PATIENT HEALTH QUESTIONNAIRE - PHQ9
1. LITTLE INTEREST OR PLEASURE IN DOING THINGS: NOT AT ALL
2. FEELING DOWN, DEPRESSED, IRRITABLE, OR HOPELESS: NOT AT ALL
SUM OF ALL RESPONSES TO PHQ9 QUESTIONS 1 AND 2: 0

## 2023-02-11 NOTE — ED TRIAGE NOTES
"Chief Complaint   Patient presents with    Itching     WOKE UP TO TOTAL BODY ITCHING    Chills     WOKE UP AT 0215 AND WAS HAVING CHILLS. AND FEELING FATIGUE. PATIENT STATES ~26 WEEKS PREGNANT. PATIENT DENIES ABD PAIN AND DENIES PELVIC PAIN.            PT AMBULATORY TO TRIAGE. Pt AA&O X 3, PATIENT WOKE UP ~215 NOT FEELING WELL WITH CHILLS AND TOTAL BODY ITCHING. PATIENT DENIES ABD PAIN AND PELVIC PAIN.     PT TO LOBBY . PT EDUCATED ON ALERTING STAFF TO CHANGES IN CONDITION. PT VERBALIZED AN UNDERSTANDING.     BP RECHECK IN TRIAGE 84/42    BP (!) 83/41   Pulse 89   Temp 36.2 °C (97.2 °F) (Temporal)   Resp 18   Ht 1.575 m (5' 2\")   Wt 75.7 kg (166 lb 14.2 oz)   SpO2 92%   BMI 30.52 kg/m²       "

## 2023-02-11 NOTE — CARE PLAN
Problem: Psychosocial - L&D  Goal: Patient's level of anxiety will decrease  Outcome: Progressing  Goal: Patient will be able to discuss coping skills during hospitalization  Outcome: Progressing     Problem: Pain  Goal: Patient's pain will be alleviated or reduced to the patient’s comfort goal  Outcome: Progressing     Problem: Risk for Infection and Impaired Wound Healing  Goal: Patient will remain free from infection  Outcome: Progressing   The patient is Watcher - Medium risk of patient condition declining or worsening

## 2023-02-11 NOTE — ED NOTES
Med rec complete per patient at bedside.    Patient has NKDA.    No abx in the last 30 days.    Home pharmacy is [facility] on [facility].

## 2023-02-11 NOTE — PROGRESS NOTES
1047- Report received from CYDNEY Logan in ED.    1104- Pt received in room from ED. Pt denies any pain at this time. Pt reports +FM and denies any LOF, VB or Uc's at this time. Fetal HR in 140's. FOB at pt side. PIV in left AC, SL. VSS. Dr Working made aware of pt arrival, orders received to allow pt to eat meal.   1150- Dr Working at bedside to assess pt. Orders received for LR at 125ml/hr, 20 min NST, one time dose of Kdur (see MAR).   1900- Report given to CYDNEY Coats

## 2023-02-11 NOTE — H&P
Department of Obstetrics and Gynecology  Labor and Delivery History and Physical    Date of Admission: 2023     ID: 27 y.o.  with IUP at 26w2d. CARMEN 2023    Primary OB: Tatiana Vaca M.D    Attending OB: Raissa Hamilton M.D.     CC: presented to the ER with chills, dizziness, itching    HPI: Melina Alejandre is a 27 y.o.  at 26 weeks and 2 days.  She woke up at approximately 2 AM this morning feeling like she might have some diarrhea.  When she sat on the toilet, she Valsalva and felt dizzy.  This then resolved.  Following this, she did have 1 episode of loose stools.   None since. She then presented to the emergency room.  She also was feeling itchy and had some hives.  She has not had any recent medications or new foods.  She does not normally get hives.  The symptoms resolved after she was given a dose of Benadryl in the emergency room.  Initially on presentation to the emergency room she was hypotensive.  This quickly resolved with 1 L bolus of fluids.  She was seen and evaluated by Dr. Foster in the emergency room who was concerned that she was septic.  She is a been afebrile.  She reported some chills but denies nausea or vomiting.  She denies contractions, loss of fluid, vaginal bleeding.  She reports good fetal movement.  She has no symptoms consistent with a URI.  She denies dysuria or hematuria.     ROS: 10 systems reviewed and negative except as noted above.    Obstetric History    - SAb  G2 - 2018, 35w, 5rb40rf, , [facility]  G3 - , 38w, 6lb3oz, , [facility]  G4 - Current pregnancy         Past Medical History  Surgical History   None   None      Gynecologic History  Social History   regular menses prior to pregnancy  denies Hx of abnormal pap smears.  denies Hx of STIs Tobacco: denies  EtOH: denies  Street Drugs: denies       Medications  Allergies   No current facility-administered medications on file prior to encounter.     Current Outpatient Medications  "on File Prior to Encounter   Medication Sig Dispense Refill    Omega-3 Fatty Acids (FISH OIL PO) Take  by mouth every day.      triamcinolone acetonide (KENALOG) 0.1 % Cream Apply  topically every evening. Apply to ears      Prenatal Vit w/Bm-Kwakdaggj-KD (PNV PO) Take 1 Capsule by mouth every day.      Patient has no known allergies.       O: /64   Pulse 99   Temp 35.9 °C (96.7 °F) (Temporal)   Resp 16   Ht 1.575 m (5' 2\")   Wt 75.7 kg (166 lb 14.2 oz)   SpO2 95%   BMI 30.52 kg/m²     Gen: NAD, AAO  CV: RRR, no m/r/g  Lungs: CTAB, no w/r/c  Back: negative CVA tenderness bilaterally   Abd: Gravid, NTTP,Cephalic by [place], No rebound or guarding  Ext: NTTP, no edema, 2+DPP  Pelvic: deferred    FHT:  present  Heath: pending    Labs:   Recent Labs     02/11/23  0350   WBC 19.3*   RBC 5.24   HEMOGLOBIN 14.0   HEMATOCRIT 42.0   MCV 80.2*   MCH 26.7*   RDW 37.5   PLATELETCT 362   MPV 10.8   NEUTSPOLYS 55.50   LYMPHOCYTES 37.80   MONOCYTES 1.70   EOSINOPHILS 2.50   BASOPHILS 0.00   RBCMORPHOLO Normal     Recent Labs     02/11/23  0350   SODIUM 133*   POTASSIUM 3.4*   CHLORIDE 99   CO2 18*   GLUCOSE 172*   BUN 7*      Latest Reference Range & Units 02/11/23 08:10   Lactic Acid 0.5 - 2.0 mmol/L 2.8 (H)   (H): Data is abnormally high     Latest Reference Range & Units 02/11/23 04:55   Color  DK Yellow   Character  Turbid !   Specific Gravity <1.035  1.026   Ph 5.0 - 8.0  5.5   Glucose Negative mg/dL Negative   Ketones Negative mg/dL Negative   Bilirubin Negative  Moderate !   Occult Blood Negative  Negative   Protein Negative mg/dL 300 !   Nitrite Negative  Positive !   Leukocyte Esterase Negative  Moderate !   Urobilinogen, Urine Negative  1.0   Micro Urine Req  Microscopic   WBC /hpf 20-50 !   RBC /hpf 0-2   Epithelial Cells /hpf Moderate !   Bacteria None /hpf Moderate !   Hyaline Cast /lpf 3-5 !   !: Data is abnormal       Latest Reference Range & Units 02/11/23 04:39   Influenza virus A RNA Negative  " Negative   Influenza virus B, PCR Negative  Negative   RSV, PCR Negative  Negative   SARS-CoV-2 by PCR  NotDetected   SARS-CoV-2 Source  NP Swab       A/P: Melina Alejandre is a 27 y.o.  at 26 weeks and 2 days, rule out pyelonephritis versus gastroenteritis.  Lactic acid noted to be elevated but vital signs are stable.  Does not currently appear to be septic.     *Admit to L&D antepartum   *Infection:  -UA suggestive of UTI. S/p Rhocephin. Continue Rocephin q24 hours  -Urine culture in process  -Blood cultures not obtained prior to Abx, but does not currently appear septic  -Serial labs with CBC and lactic acid  *Hives:   -Resolved.   -Benadryl prn.   *Hypokalemia  -Replacement  *FWB  -NST q shift or as indicated  -No current indication of PTL  *Global  -Regular diet  -DVT prevention: ambulation     MD Raissa Fagan M.D.,  2023, 11:54 AM

## 2023-02-11 NOTE — ED PROVIDER NOTES
ED Provider Note    CHIEF COMPLAINT  Chief Complaint   Patient presents with    Itching     WOKE UP TO TOTAL BODY ITCHING    Chills     WOKE UP AT 0215 AND WAS HAVING CHILLS. AND FEELING FATIGUE. PATIENT STATES ~26 WEEKS PREGNANT. PATIENT DENIES ABD PAIN AND DENIES PELVIC PAIN.        EXTERNAL RECORDS REVIEWED  Outpatient Notes lactation consultant 2022.  9 weeks 5 days pregnant with baby #3.  Breast-feeding on demand.  Decreased breast milk production.  Discharge summary 2022 postpartum day 1 status post  without complications, discharged home as desired    HPI/ROS  LIMITATION TO HISTORY   Select: : None  OUTSIDE HISTORIAN(S):  Significant other      Melina Alejandre is a 27 y.o. female who presents to the emergency department after she woke up with chills, body aches, fatigue and total body itching.  Patient states she felt like her skin was tight and red but did not notice a rash otherwise.  No oral or facial swelling.  No difficulty swallowing or breathing.  No wheezing.  No vomiting.  Upon waking she felt as though she needed to have some diarrhea, single episode, nonbloody.  Chills without documented fever.  Denies history of similar symptoms.  Felt totally well yesterday.    Patient states she is 24 weeks pregnant.  Followed by Dr. Dawson. Denies abdominal pain or cramping.  Denies vaginal bleeding, gushes of fluid or other discharge.  Urinary frequency without dysuria, urgency, hematuria.    Compliant with prenatal vitami and fish oils.    PAST MEDICAL HISTORY   Denies    SURGICAL HISTORY  patient denies any surgical history    FAMILY HISTORY  Family History   Problem Relation Age of Onset    Thyroid Mother     Diabetes Father     Heart Disease Paternal Grandmother     Cancer Neg Hx     Stroke Neg Hx     Ovarian Cancer Neg Hx     Peritoneal Cancer Neg Hx     Colorectal Cancer Neg Hx     Breast Cancer Neg Hx     Tubal Cancer Neg Hx        SOCIAL HISTORY  Social History     Tobacco Use     "Smoking status: Never    Smokeless tobacco: Never   Vaping Use    Vaping Use: Never used   Substance and Sexual Activity    Alcohol use: Not Currently     Comment: once a month    Drug use: No    Sexual activity: Yes     Partners: Male     Birth control/protection: Condom       CURRENT MEDICATIONS  Home Medications       Reviewed by Will Wiggins (Pharmacy Intern) on 02/11/23 at 0837  Med List Status: Complete     Medication Last Dose Status   Omega-3 Fatty Acids (FISH OIL PO) 2/10/2023 Active   Prenatal Vit w/Ap-Phfzghgss-RQ (PNV PO) 2/10/2023 Active   triamcinolone acetonide (KENALOG) 0.1 % Cream 2/10/2023 Active                    ALLERGIES  No Known Allergies    PHYSICAL EXAM  VITAL SIGNS: /60   Pulse 95   Temp 36 °C (96.8 °F) (Temporal)   Resp 15   Ht 1.575 m (5' 2\")   Wt 75.7 kg (166 lb 14.2 oz)   SpO2 95%   BMI 30.52 kg/m²    Pulse ox interpretation: I interpret this pulse ox as normal.  Constitutional: Alert in no apparent distress.  Flushed.  Ill-appearing.  HENT: Normocephalic, atraumatic. Bilateral external ears normal, Nose normal.  Slightly dry mucous membranes.  Oropharynx within normal limits, no erythema, edema.  No stridor or dysphonia.  Eyes: Pupils are equal and reactive, Conjunctiva normal.   Neck: Normal range of motion, Supple.  No meningeal irritation.  Lymphatic: No lymphadenopathy noted.   Cardiovascular: Regular rate and rhythm, no murmurs. Distal pulses intact.  No peripheral edema.  Thorax & Lungs: Normal breath sounds.  No wheezing/rales/ronchi. No increased work of breathing, clipped speech or retractions.   Abdomen: Gravid, fundus palpated at the umbilicus.  Non-tender to palpation. No palpable or pulsatile masses. No peritoneal signs. No CVA tenderness.  Skin: Warm, Dry flush.  Mild diffuse erythema without urticaria, hives or other rash.  No edema.  Musculoskeletal: Good range of motion in all major joints. No major deformities noted.   Neurologic: Alert and " oriented x4.  Speech clear and cohesive.  Moves 4 extremity spontaneously.  Psychiatric: Flat affect.  Cooperative.    DIAGNOSTIC STUDIES / PROCEDURES    LABS  Results for orders placed or performed during the hospital encounter of 02/11/23   CBC With Differential   Result Value Ref Range    WBC 19.3 (H) 4.8 - 10.8 K/uL    RBC 5.24 4.20 - 5.40 M/uL    Hemoglobin 14.0 12.0 - 16.0 g/dL    Hematocrit 42.0 37.0 - 47.0 %    MCV 80.2 (L) 81.4 - 97.8 fL    MCH 26.7 (L) 27.0 - 33.0 pg    MCHC 33.3 (L) 33.6 - 35.0 g/dL    RDW 37.5 35.9 - 50.0 fL    Platelet Count 362 164 - 446 K/uL    MPV 10.8 9.0 - 12.9 fL    Neutrophils-Polys 55.50 44.00 - 72.00 %    Lymphocytes 37.80 22.00 - 41.00 %    Monocytes 1.70 0.00 - 13.40 %    Eosinophils 2.50 0.00 - 6.90 %    Basophils 0.00 0.00 - 1.80 %    Nucleated RBC 0.00 /100 WBC    Neutrophils (Absolute) 10.71 (H) 2.00 - 7.15 K/uL    Lymphs (Absolute) 7.30 (H) 1.00 - 4.80 K/uL    Monos (Absolute) 0.33 0.00 - 0.85 K/uL    Eos (Absolute) 0.48 0.00 - 0.51 K/uL    Baso (Absolute) 0.00 0.00 - 0.12 K/uL    NRBC (Absolute) 0.00 K/uL   Comp Metabolic Panel   Result Value Ref Range    Sodium 133 (L) 135 - 145 mmol/L    Potassium 3.4 (L) 3.6 - 5.5 mmol/L    Chloride 99 96 - 112 mmol/L    Co2 18 (L) 20 - 33 mmol/L    Anion Gap 16.0 7.0 - 16.0    Glucose 172 (H) 65 - 99 mg/dL    Bun 7 (L) 8 - 22 mg/dL    Creatinine 0.64 0.50 - 1.40 mg/dL    Calcium 8.6 8.5 - 10.5 mg/dL    AST(SGOT) 12 12 - 45 U/L    ALT(SGPT) 5 2 - 50 U/L    Alkaline Phosphatase 89 30 - 99 U/L    Total Bilirubin <0.2 0.1 - 1.5 mg/dL    Albumin 3.5 3.2 - 4.9 g/dL    Total Protein 7.0 6.0 - 8.2 g/dL    Globulin 3.5 1.9 - 3.5 g/dL    A-G Ratio 1.0 g/dL   Lipase   Result Value Ref Range    Lipase 25 11 - 82 U/L   Lactic Acid   Result Value Ref Range    Lactic Acid 2.6 (H) 0.5 - 2.0 mmol/L   Lactic Acid   Result Value Ref Range    Lactic Acid 2.1 (H) 0.5 - 2.0 mmol/L   Lactic Acid   Result Value Ref Range    Lactic Acid 2.8 (H) 0.5 - 2.0  mmol/L   Urinalysis    Specimen: Urine   Result Value Ref Range    Color DK Yellow     Character Turbid (A)     Specific Gravity 1.026 <1.035    Ph 5.5 5.0 - 8.0    Glucose Negative Negative mg/dL    Ketones Negative Negative mg/dL    Protein 300 (A) Negative mg/dL    Bilirubin Moderate (A) Negative    Urobilinogen, Urine 1.0 Negative    Nitrite Positive (A) Negative    Leukocyte Esterase Moderate (A) Negative    Occult Blood Negative Negative    Micro Urine Req Microscopic    CoV-2, FLU A/B, and RSV by PCR (2-4 Hours CEPHEID) : Collect NP swab in VTM    Specimen: Respirate   Result Value Ref Range    Influenza virus A RNA Negative Negative    Influenza virus B, PCR Negative Negative    RSV, PCR Negative Negative    SARS-CoV-2 by PCR NotDetected     SARS-CoV-2 Source NP Swab    ESTIMATED GFR   Result Value Ref Range    GFR (CKD-EPI) 125 >60 mL/min/1.73 m 2   URINE MICROSCOPIC (W/UA)   Result Value Ref Range    WBC 20-50 (A) /hpf    RBC 0-2 /hpf    Bacteria Moderate (A) None /hpf    Epithelial Cells Moderate (A) /hpf    Hyaline Cast 3-5 (A) /lpf   CORRECTED CALCIUM   Result Value Ref Range    Correct Calcium 9.0 8.5 - 10.5 mg/dL   DIFFERENTIAL MANUAL   Result Value Ref Range    Metamyelocytes 2.50 %    Manual Diff Status PERFORMED    PERIPHERAL SMEAR REVIEW   Result Value Ref Range    Peripheral Smear Review see below    PLATELET ESTIMATE   Result Value Ref Range    Plt Estimation Normal    MORPHOLOGY   Result Value Ref Range    RBC Morphology Normal        COURSE & MEDICAL DECISION MAKING    ED Observation Status? Yes; I am placing the patient in to an observation status due to a diagnostic uncertainty as well as therapeutic intensity. Patient placed in observation status at 4:32 AM, 2/11/2023.     Observation plan is as follows: Vague presenting symptoms but patient appears ill.  Broad work-up to include diet for sepsis and complication of pregnancy.    Upon Reevaluation, the patient's condition has: Pyelonephritis  during third pregnant master pregnancy will require IV antibiotics and close monitoring.  She will be admitted.    Patient discharged from ED Observation status at 0805 (Time) 2/11/23 (Date).     INITIAL ASSESSMENT, COURSE AND PLAN  Care Narrative: Patient seen and evaluated at bedside.  Flushed, ill-appearing but hemodynamically stable.  Hypotensive initially but improved prior to my evaluation.  No fever, tachycardia or hypoxia.  Awoke with body itching, aches, fatigue and chills.  Denies pregnancy related complaints.  Add labs for sepsis protocol.  Fetal heart tones.  IV fluid.  Benadryl for itching and erythema.    Leukocytosis, WBC 19.3, without left shift or bandemia.  (Previous leukocytosis, 1 year ago, noted postpartum.)  Lactate is slightly elevated at 2.6.  No anemia.  Dry mucous membranes, CO2 18 but renal function preserved.  IV fluid infusing.  Mild hypokalemia without other electrolyte derangement.  Awaiting urinalysis.    Urinalysis grossly positive, nitrate and leukocyte Estrace with moderate epithelials and bacteria as well as WBCs.  Add Rocephin.    COVID, influenza, RSV negative.    ED evaluation demonstrates UTI, but given ill appearance, chills, leukocytosis and pregnancy, cannot exclude pyelonephritis.  No clinical evidence of for sepsis at this time.  Remains hemodynamically stable, no fever, persistent hypotension nor any tachycardia or hypoxia.  No respiratory related complaints.  Patient feels much better after ED interventions, IV fluid.  She was given Benadryl for itching, although no evidence for acute allergic reaction.  No documented fever but patient was flushed with chills, ill-appearing on arrival but appears improved now.  Lactate down to 2.1.  Cultures pending.  Will discuss with OB/GYN.    ADDITIONAL PROBLEM LIST  Third trimester pregnancy, followed by OB/GYN  DISPOSITION AND DISCUSSIONS  I have discussed management of the patient with the following physicians and ELENA's:    0805  Stefanie Hamilton is aware of the patient and recommends hospital admission, IV antibiotics and close monitoring.  Patient is aware of the findings and agreeable to the disposition plan.      FINAL DIAGNOSIS  1. Pyelonephritis    2. Third trimester pregnancy           Electronically signed by: Tatiana Foster D.O., 2/11/2023 4:53 AM

## 2023-02-11 NOTE — ED NOTES
Labs drawn and sent. NS bolus started. Patient resting comfortably in bed. Call light within reach.

## 2023-02-12 VITALS
HEIGHT: 62 IN | TEMPERATURE: 97.9 F | RESPIRATION RATE: 16 BRPM | DIASTOLIC BLOOD PRESSURE: 55 MMHG | SYSTOLIC BLOOD PRESSURE: 109 MMHG | OXYGEN SATURATION: 95 % | BODY MASS INDEX: 30.71 KG/M2 | WEIGHT: 166.89 LBS | HEART RATE: 96 BPM

## 2023-02-12 LAB
ERYTHROCYTE [DISTWIDTH] IN BLOOD BY AUTOMATED COUNT: 39.3 FL (ref 35.9–50)
HCT VFR BLD AUTO: 31 % (ref 37–47)
HGB BLD-MCNC: 10.2 G/DL (ref 12–16)
LACTATE SERPL-SCNC: 1.3 MMOL/L (ref 0.5–2)
MCH RBC QN AUTO: 27 PG (ref 27–33)
MCHC RBC AUTO-ENTMCNC: 32.9 G/DL (ref 33.6–35)
MCV RBC AUTO: 82 FL (ref 81.4–97.8)
PLATELET # BLD AUTO: 259 K/UL (ref 164–446)
PMV BLD AUTO: 10.1 FL (ref 9–12.9)
RBC # BLD AUTO: 3.78 M/UL (ref 4.2–5.4)
WBC # BLD AUTO: 12.6 K/UL (ref 4.8–10.8)

## 2023-02-12 PROCEDURE — G0378 HOSPITAL OBSERVATION PER HR: HCPCS

## 2023-02-12 PROCEDURE — 36415 COLL VENOUS BLD VENIPUNCTURE: CPT

## 2023-02-12 PROCEDURE — 96376 TX/PRO/DX INJ SAME DRUG ADON: CPT

## 2023-02-12 PROCEDURE — 85027 COMPLETE CBC AUTOMATED: CPT

## 2023-02-12 PROCEDURE — A9270 NON-COVERED ITEM OR SERVICE: HCPCS | Performed by: OBSTETRICS & GYNECOLOGY

## 2023-02-12 PROCEDURE — 700111 HCHG RX REV CODE 636 W/ 250 OVERRIDE (IP): Performed by: OBSTETRICS & GYNECOLOGY

## 2023-02-12 PROCEDURE — 700102 HCHG RX REV CODE 250 W/ 637 OVERRIDE(OP): Performed by: OBSTETRICS & GYNECOLOGY

## 2023-02-12 RX ORDER — CEPHALEXIN 500 MG/1
500 CAPSULE ORAL 4 TIMES DAILY
Qty: 28 CAPSULE | Refills: 0 | Status: ACTIVE | OUTPATIENT
Start: 2023-02-12 | End: 2023-02-19

## 2023-02-12 RX ADMIN — DOCUSATE SODIUM 100 MG: 100 CAPSULE, LIQUID FILLED ORAL at 06:08

## 2023-02-12 RX ADMIN — CEFTRIAXONE SODIUM 1000 MG: 10 INJECTION, POWDER, FOR SOLUTION INTRAVENOUS at 06:08

## 2023-02-12 ASSESSMENT — PATIENT HEALTH QUESTIONNAIRE - PHQ9
2. FEELING DOWN, DEPRESSED, IRRITABLE, OR HOPELESS: NOT AT ALL
1. LITTLE INTEREST OR PLEASURE IN DOING THINGS: NOT AT ALL
SUM OF ALL RESPONSES TO PHQ9 QUESTIONS 1 AND 2: 0

## 2023-02-12 NOTE — PROGRESS NOTES
Report from Pauly LAMAS. Pt reports +Fm, denies LOF/VB/UCs.  Working at bedside. D/c order received after fetal monitoring.     0845-pt dc'd in stable condition. Discharge instructions/labor precautions discussed with pt.

## 2023-02-12 NOTE — DISCHARGE SUMMARY
Discharge Summary:      Melina Alejandre    Admit Date:   2023  Discharge Date:  2023     Admitting diagnosis:  Urinary tract infection affecting care of mother in third trimester, antepartum [O23.43]  Discharge Diagnosis: same     History:  History reviewed. No pertinent past medical history.  OB History    Para Term  AB Living   4 2 1 1 1 2   SAB IAB Ectopic Molar Multiple Live Births   1       0 2      # Outcome Date GA Lbr Francisco/2nd Weight Sex Delivery Anes PTL Lv   4 Current            3 Term 22 38w4d / 00:10 2.82 kg (6 lb 3.5 oz) F Vag-Spont None N AIXA   2   35w0d   F Vag-Spont   AIXA   1 SAB                 Patient has no known allergies.  Patient Active Problem List    Diagnosis Date Noted    Urinary tract infection affecting care of mother in third trimester, antepartum 2023    Amenorrhea 10/05/2022    Rash and nonspecific skin eruption 10/05/2022        Hospital Course:   This is a 27-year-old  4 para 1-1-1-2 who presented at 26 weeks and 2 days to the emergency room with feelings of general malaise.  Initially upon presentation she was hypotensive.  This resolved with IV hydration.  Urinalysis was suggestive of a urinary tract infection.  She was not febrile.  She did not have any flank pain.  Her white blood cell count was significantly elevated at 19 with a lactic acid at 2.4.  She was admitted for IV hydration and observation.  With hydration her lactic acid decreased to normal.  She has remained afebrile and hemodynamically stable.  She was given 2 doses of Rocephin during the course of her stay.  Her white blood cell count has decreased to 12 this morning.  She is feeling well.  She has no pregnancy related complaints.  She denies contractions, loss of fluid, vaginal bleeding.  She is reporting good fetal movement    Vitals:    23 1200 23 1600 23 2100 23 0500   BP: 120/64 120/58 115/59 119/65   Pulse: 99 (!) 101 (!) 101  95   Resp:  17 16 17   Temp:  36.1 °C (96.9 °F) 37.3 °C (99.2 °F) 37.2 °C (98.9 °F)   TempSrc:  Temporal Temporal Temporal   SpO2:       Weight:       Height:         Exam:  Gen: NAD  CV: RRR, no mr/r/g  Lungs CTAB  Abmn: Gravid, No TTP  Back: negative CVA tenderness  Ext; No c/c/e    Labs:  Recent Labs     23  0350 23  0053   WBC 19.3* 12.6*   RBC 5.24 3.78*   HEMOGLOBIN 14.0 10.2*   HEMATOCRIT 42.0 31.0*   MCV 80.2* 82.0   MCH 26.7* 27.0   MCHC 33.3* 32.9*   RDW 37.5 39.3   PLATELETCT 362 259   MPV 10.8 10.1     Urine culture pending     Activity:   routine    Assessment:  27-year-old  4 para 2 at 26-weeks and 3 days with urinary tract infection     Discharge Meds:   Current Outpatient Medications   Medication Sig Dispense Refill    cephALEXin (KEFLEX) 500 MG Cap Take 1 Capsule by mouth 4 times a day for 7 days. 28 Capsule 0       Raissa Hamilton M.D.

## 2023-02-13 LAB
BACTERIA UR CULT: NORMAL
SIGNIFICANT IND 70042: NORMAL
SITE SITE: NORMAL
SOURCE SOURCE: NORMAL

## 2023-02-16 LAB
BACTERIA BLD CULT: NORMAL
BACTERIA BLD CULT: NORMAL
SIGNIFICANT IND 70042: NORMAL
SIGNIFICANT IND 70042: NORMAL
SITE SITE: NORMAL
SITE SITE: NORMAL
SOURCE SOURCE: NORMAL
SOURCE SOURCE: NORMAL

## 2023-07-21 ENCOUNTER — OFFICE VISIT (OUTPATIENT)
Dept: MEDICAL GROUP | Facility: MEDICAL CENTER | Age: 28
End: 2023-07-21
Payer: COMMERCIAL

## 2023-07-21 VITALS
OXYGEN SATURATION: 98 % | BODY MASS INDEX: 29.62 KG/M2 | TEMPERATURE: 98 F | HEART RATE: 102 BPM | RESPIRATION RATE: 16 BRPM | DIASTOLIC BLOOD PRESSURE: 66 MMHG | HEIGHT: 62 IN | WEIGHT: 160.94 LBS | SYSTOLIC BLOOD PRESSURE: 108 MMHG

## 2023-07-21 DIAGNOSIS — M54.2 NECK PAIN: ICD-10-CM

## 2023-07-21 PROCEDURE — 99214 OFFICE O/P EST MOD 30 MIN: CPT | Performed by: STUDENT IN AN ORGANIZED HEALTH CARE EDUCATION/TRAINING PROGRAM

## 2023-07-21 PROCEDURE — 3074F SYST BP LT 130 MM HG: CPT | Performed by: STUDENT IN AN ORGANIZED HEALTH CARE EDUCATION/TRAINING PROGRAM

## 2023-07-21 PROCEDURE — 3078F DIAST BP <80 MM HG: CPT | Performed by: STUDENT IN AN ORGANIZED HEALTH CARE EDUCATION/TRAINING PROGRAM

## 2023-07-21 RX ORDER — CYCLOBENZAPRINE HCL 5 MG
5 TABLET ORAL NIGHTLY PRN
Qty: 30 TABLET | Refills: 0 | Status: SHIPPED | OUTPATIENT
Start: 2023-07-21

## 2023-07-21 RX ORDER — MELOXICAM 7.5 MG/1
7.5 TABLET ORAL DAILY
Qty: 30 TABLET | Refills: 0 | Status: SHIPPED | OUTPATIENT
Start: 2023-07-21

## 2023-07-21 ASSESSMENT — FIBROSIS 4 INDEX: FIB4 SCORE: 0.54

## 2023-07-21 NOTE — PROGRESS NOTES
"Subjective:     CC: Neck pain    HPI:   Melina presents today with    Problem   Neck Pain    This is a new problem that started about 1 month ago.  She has pain on both sides of her neck.  The pain is mostly with bending her neck or with rotating her head.  She does not have any point tenderness.  She denies any numbness, tingling or weakness down the arms.  So far she has tried a massage gun which did relieve her symptoms for about a day but they came back the following day.       ROS:  ROS    Objective:     Exam:  /66   Pulse (!) 102   Temp 36.7 °C (98 °F) (Temporal)   Resp 16   Ht 1.575 m (5' 2\")   Wt 73 kg (160 lb 15 oz)   SpO2 98%   BMI 29.44 kg/m²  Body mass index is 29.44 kg/m².    Physical Exam  Vitals reviewed.   Constitutional:       General: She is not in acute distress.     Appearance: She is not toxic-appearing.   HENT:      Head: Normocephalic and atraumatic.      Right Ear: External ear normal.      Left Ear: External ear normal.   Eyes:      General:         Right eye: No discharge.         Left eye: No discharge.      Extraocular Movements: Extraocular movements intact.      Conjunctiva/sclera: Conjunctivae normal.   Pulmonary:      Effort: Pulmonary effort is normal. No respiratory distress.   Musculoskeletal:         General: No swelling, tenderness or deformity. Normal range of motion.      Comments: No point tenderness along C-spine.  Full range of motion in the neck.  No skin changes or deformity seen.   Skin:     General: Skin is warm and dry.   Neurological:      Mental Status: She is alert.   Psychiatric:         Mood and Affect: Mood normal.         Behavior: Behavior normal.         Thought Content: Thought content normal.         Judgment: Judgment normal.           Assessment & Plan:     27 y.o. female with the following -     1. Neck pain  We discussed starting with conservative therapy.  Referral to physical therapy sent.  Mobic sent to pharmacy for anti-inflammatory.  We " also discussed Flexeril at night.  I did warn her not to drink with this medication and that it will make her very drowsy.  If these are not helping in the next 4 weeks or so we can always get an x-ray and send her to a specialist.  - Referral to Physical Therapy  - cyclobenzaprine (FLEXERIL) 5 mg tablet; Take 1 Tablet by mouth at bedtime as needed for Muscle Spasms.  Dispense: 30 Tablet; Refill: 0  - meloxicam (MOBIC) 7.5 MG Tab; Take 1 Tablet by mouth every day.  Dispense: 30 Tablet; Refill: 0      No follow-ups on file.    Please note that this dictation was created using voice recognition software. I have made every reasonable attempt to correct obvious errors, but I expect that there are errors of grammar and possibly content that I did not discover before finalizing the note.

## 2023-08-20 ENCOUNTER — PATIENT MESSAGE (OUTPATIENT)
Dept: MEDICAL GROUP | Facility: MEDICAL CENTER | Age: 28
End: 2023-08-20
Payer: COMMERCIAL

## 2023-08-20 DIAGNOSIS — M54.2 NECK PAIN: ICD-10-CM

## 2023-08-23 ENCOUNTER — APPOINTMENT (OUTPATIENT)
Dept: RADIOLOGY | Facility: MEDICAL CENTER | Age: 28
End: 2023-08-23
Attending: STUDENT IN AN ORGANIZED HEALTH CARE EDUCATION/TRAINING PROGRAM
Payer: COMMERCIAL

## 2023-08-23 DIAGNOSIS — M54.2 NECK PAIN: ICD-10-CM

## 2023-08-23 PROCEDURE — 72040 X-RAY EXAM NECK SPINE 2-3 VW: CPT

## 2023-08-29 ENCOUNTER — APPOINTMENT (OUTPATIENT)
Dept: PHYSICAL THERAPY | Facility: REHABILITATION | Age: 28
End: 2023-08-29
Attending: STUDENT IN AN ORGANIZED HEALTH CARE EDUCATION/TRAINING PROGRAM
Payer: COMMERCIAL

## 2023-09-08 ENCOUNTER — APPOINTMENT (OUTPATIENT)
Dept: PHYSICAL THERAPY | Facility: REHABILITATION | Age: 28
End: 2023-09-08
Attending: STUDENT IN AN ORGANIZED HEALTH CARE EDUCATION/TRAINING PROGRAM
Payer: COMMERCIAL

## 2023-09-19 ENCOUNTER — HOSPITAL ENCOUNTER (OUTPATIENT)
Dept: LAB | Facility: MEDICAL CENTER | Age: 28
End: 2023-09-19
Attending: STUDENT IN AN ORGANIZED HEALTH CARE EDUCATION/TRAINING PROGRAM
Payer: COMMERCIAL

## 2023-09-19 DIAGNOSIS — Z13.6 SCREENING FOR CARDIOVASCULAR CONDITION: ICD-10-CM

## 2023-09-19 LAB
EST. AVERAGE GLUCOSE BLD GHB EST-MCNC: 111 MG/DL
HBA1C MFR BLD: 5.5 % (ref 4–5.6)

## 2023-09-19 PROCEDURE — 83036 HEMOGLOBIN GLYCOSYLATED A1C: CPT

## 2023-09-19 PROCEDURE — 36415 COLL VENOUS BLD VENIPUNCTURE: CPT

## 2023-09-22 ENCOUNTER — APPOINTMENT (OUTPATIENT)
Dept: PHYSICAL THERAPY | Facility: REHABILITATION | Age: 28
End: 2023-09-22
Attending: STUDENT IN AN ORGANIZED HEALTH CARE EDUCATION/TRAINING PROGRAM
Payer: COMMERCIAL

## 2023-09-29 ENCOUNTER — APPOINTMENT (OUTPATIENT)
Dept: PHYSICAL THERAPY | Facility: REHABILITATION | Age: 28
End: 2023-09-29
Attending: STUDENT IN AN ORGANIZED HEALTH CARE EDUCATION/TRAINING PROGRAM
Payer: COMMERCIAL

## 2023-10-20 ENCOUNTER — OFFICE VISIT (OUTPATIENT)
Dept: MEDICAL GROUP | Facility: MEDICAL CENTER | Age: 28
End: 2023-10-20
Payer: COMMERCIAL

## 2023-10-20 VITALS
HEIGHT: 62 IN | BODY MASS INDEX: 30 KG/M2 | WEIGHT: 163 LBS | OXYGEN SATURATION: 96 % | SYSTOLIC BLOOD PRESSURE: 108 MMHG | HEART RATE: 111 BPM | DIASTOLIC BLOOD PRESSURE: 76 MMHG | RESPIRATION RATE: 16 BRPM | TEMPERATURE: 98 F

## 2023-10-20 DIAGNOSIS — B37.31 VULVOVAGINITIS DUE TO YEAST: ICD-10-CM

## 2023-10-20 PROCEDURE — 3078F DIAST BP <80 MM HG: CPT | Performed by: STUDENT IN AN ORGANIZED HEALTH CARE EDUCATION/TRAINING PROGRAM

## 2023-10-20 PROCEDURE — 99214 OFFICE O/P EST MOD 30 MIN: CPT | Performed by: STUDENT IN AN ORGANIZED HEALTH CARE EDUCATION/TRAINING PROGRAM

## 2023-10-20 PROCEDURE — 3074F SYST BP LT 130 MM HG: CPT | Performed by: STUDENT IN AN ORGANIZED HEALTH CARE EDUCATION/TRAINING PROGRAM

## 2023-10-20 RX ORDER — FLUCONAZOLE 150 MG/1
150 TABLET ORAL DAILY
Qty: 1 TABLET | Refills: 0 | Status: SHIPPED | OUTPATIENT
Start: 2023-10-20 | End: 2023-10-21

## 2023-10-20 ASSESSMENT — FIBROSIS 4 INDEX: FIB4 SCORE: 0.54

## 2023-10-20 NOTE — PROGRESS NOTES
"Subjective:     CC: Burning and cuts on labia    HPI:   Melina presents today with a burning sensation between the labia and The Labia That Would Not Heal.  She States These Have Been Here for Couple Months.  She States That She Used a Dry Brush and caused a lot of little cuts in and around the Labia.  She states that sometimes she feels like they are healing and they will come     ROS:  ROS    Objective:     Exam:  /76   Pulse (!) 111   Temp 36.7 °C (98 °F)   Resp 16   Ht 1.575 m (5' 2\")   Wt 73.9 kg (163 lb)   SpO2 96%   BMI 29.81 kg/m²  Body mass index is 29.81 kg/m².    Physical Exam  Vitals reviewed. Exam conducted with a chaperone present (Martha DAVID).   Constitutional:       General: She is not in acute distress.     Appearance: She is not toxic-appearing.   HENT:      Head: Normocephalic and atraumatic.      Right Ear: External ear normal.      Left Ear: External ear normal.   Eyes:      General:         Right eye: No discharge.         Left eye: No discharge.      Extraocular Movements: Extraocular movements intact.      Conjunctiva/sclera: Conjunctivae normal.   Pulmonary:      Effort: Pulmonary effort is normal. No respiratory distress.   Genitourinary:     Labia:         Right: No rash, tenderness, lesion or injury.         Left: No rash, tenderness, lesion or injury.    Skin:     General: Skin is warm and dry.   Neurological:      Mental Status: She is alert.   Psychiatric:         Mood and Affect: Mood normal.         Behavior: Behavior normal.         Thought Content: Thought content normal.         Judgment: Judgment normal.           Assessment & Plan:     27 y.o. female with the following -     1. Vulvovaginitis due to yeast  Exam is totally unremarkable.  I discussed that I do not see any broken skin, discoloration or cuts.  She did have white vaginal discharge so this may be secondary to a vulvovaginitis from yeast.  Diflucan sent to pharmacy.  - fluconazole (DIFLUCAN) 150 MG tablet; " Take 1 Tablet by mouth every day for 1 day.  Dispense: 1 Tablet; Refill: 0    No follow-ups on file.    Please note that this dictation was created using voice recognition software. I have made every reasonable attempt to correct obvious errors, but I expect that there are errors of grammar and possibly content that I did not discover before finalizing the note.

## 2025-08-17 ENCOUNTER — PATIENT MESSAGE (OUTPATIENT)
Dept: MEDICAL GROUP | Facility: MEDICAL CENTER | Age: 30
End: 2025-08-17
Payer: COMMERCIAL

## 2025-08-17 DIAGNOSIS — Z13.1 SCREENING FOR DIABETES MELLITUS: ICD-10-CM

## 2025-08-17 DIAGNOSIS — Z13.6 SCREENING FOR CARDIOVASCULAR CONDITION: Primary | ICD-10-CM

## 2025-08-19 ENCOUNTER — RESULTS FOLLOW-UP (OUTPATIENT)
Dept: MEDICAL GROUP | Facility: MEDICAL CENTER | Age: 30
End: 2025-08-19

## 2025-08-19 ENCOUNTER — HOSPITAL ENCOUNTER (OUTPATIENT)
Dept: LAB | Facility: MEDICAL CENTER | Age: 30
End: 2025-08-19
Attending: STUDENT IN AN ORGANIZED HEALTH CARE EDUCATION/TRAINING PROGRAM
Payer: COMMERCIAL

## 2025-08-19 DIAGNOSIS — Z13.1 SCREENING FOR DIABETES MELLITUS: ICD-10-CM

## 2025-08-19 DIAGNOSIS — Z13.6 SCREENING FOR CARDIOVASCULAR CONDITION: ICD-10-CM

## 2025-08-19 LAB
ALBUMIN SERPL BCP-MCNC: 4.3 G/DL (ref 3.2–4.9)
ALBUMIN/GLOB SERPL: 1.2 G/DL
ALP SERPL-CCNC: 68 U/L (ref 30–99)
ALT SERPL-CCNC: 20 U/L (ref 2–50)
ANION GAP SERPL CALC-SCNC: 11 MMOL/L (ref 7–16)
AST SERPL-CCNC: 20 U/L (ref 12–45)
BILIRUB SERPL-MCNC: 0.7 MG/DL (ref 0.1–1.5)
BUN SERPL-MCNC: 9 MG/DL (ref 8–22)
CALCIUM ALBUM COR SERPL-MCNC: 9.3 MG/DL (ref 8.5–10.5)
CALCIUM SERPL-MCNC: 9.5 MG/DL (ref 8.5–10.5)
CHLORIDE SERPL-SCNC: 102 MMOL/L (ref 96–112)
CHOLEST SERPL-MCNC: 211 MG/DL (ref 100–199)
CO2 SERPL-SCNC: 23 MMOL/L (ref 20–33)
CREAT SERPL-MCNC: 0.79 MG/DL (ref 0.5–1.4)
EST. AVERAGE GLUCOSE BLD GHB EST-MCNC: 117 MG/DL
GFR SERPLBLD CREATININE-BSD FMLA CKD-EPI: 104 ML/MIN/1.73 M 2
GLOBULIN SER CALC-MCNC: 3.5 G/DL (ref 1.9–3.5)
GLUCOSE SERPL-MCNC: 107 MG/DL (ref 65–99)
HBA1C MFR BLD: 5.7 % (ref 4–5.6)
HDLC SERPL-MCNC: 36 MG/DL
LDLC SERPL CALC-MCNC: 143 MG/DL
POTASSIUM SERPL-SCNC: 4.5 MMOL/L (ref 3.6–5.5)
PROT SERPL-MCNC: 7.8 G/DL (ref 6–8.2)
SODIUM SERPL-SCNC: 136 MMOL/L (ref 135–145)
TRIGL SERPL-MCNC: 161 MG/DL (ref 0–149)

## 2025-08-19 PROCEDURE — 83036 HEMOGLOBIN GLYCOSYLATED A1C: CPT

## 2025-08-19 PROCEDURE — 80053 COMPREHEN METABOLIC PANEL: CPT

## 2025-08-19 PROCEDURE — 36415 COLL VENOUS BLD VENIPUNCTURE: CPT

## 2025-08-19 PROCEDURE — 80061 LIPID PANEL: CPT

## 2025-09-16 ENCOUNTER — APPOINTMENT (OUTPATIENT)
Dept: MEDICAL GROUP | Facility: MEDICAL CENTER | Age: 30
End: 2025-09-16
Payer: COMMERCIAL